# Patient Record
Sex: FEMALE | Race: WHITE | NOT HISPANIC OR LATINO | Employment: OTHER | URBAN - NONMETROPOLITAN AREA
[De-identification: names, ages, dates, MRNs, and addresses within clinical notes are randomized per-mention and may not be internally consistent; named-entity substitution may affect disease eponyms.]

---

## 2017-01-26 ENCOUNTER — OFFICE VISIT (OUTPATIENT)
Dept: CARDIOLOGY | Facility: CLINIC | Age: 72
End: 2017-01-26
Payer: MEDICARE

## 2017-01-26 VITALS
WEIGHT: 155 LBS | HEART RATE: 53 BPM | OXYGEN SATURATION: 93 % | BODY MASS INDEX: 26.46 KG/M2 | DIASTOLIC BLOOD PRESSURE: 62 MMHG | HEIGHT: 64 IN | SYSTOLIC BLOOD PRESSURE: 128 MMHG

## 2017-01-26 DIAGNOSIS — Z95.5 STENTED CORONARY ARTERY: ICD-10-CM

## 2017-01-26 DIAGNOSIS — J43.9 PULMONARY EMPHYSEMA, UNSPECIFIED EMPHYSEMA TYPE (HCC): ICD-10-CM

## 2017-01-26 DIAGNOSIS — G47.34 NOCTURNAL HYPOXEMIA: ICD-10-CM

## 2017-01-26 DIAGNOSIS — E78.5 HYPERLIPIDEMIA, UNSPECIFIED HYPERLIPIDEMIA TYPE: ICD-10-CM

## 2017-01-26 DIAGNOSIS — I25.10 ASCVD (ARTERIOSCLEROTIC CARDIOVASCULAR DISEASE): ICD-10-CM

## 2017-01-26 DIAGNOSIS — I25.2 OLD MI (MYOCARDIAL INFARCTION): ICD-10-CM

## 2017-01-26 DIAGNOSIS — I10 ESSENTIAL HYPERTENSION: ICD-10-CM

## 2017-01-26 DIAGNOSIS — R06.09 DOE (DYSPNEA ON EXERTION): ICD-10-CM

## 2017-01-26 PROCEDURE — G8598 ASA/ANTIPLAT THER USED: HCPCS | Performed by: INTERNAL MEDICINE

## 2017-01-26 PROCEDURE — 1101F PT FALLS ASSESS-DOCD LE1/YR: CPT | Performed by: INTERNAL MEDICINE

## 2017-01-26 PROCEDURE — 1036F TOBACCO NON-USER: CPT | Performed by: INTERNAL MEDICINE

## 2017-01-26 PROCEDURE — 4040F PNEUMOC VAC/ADMIN/RCVD: CPT | Mod: 8P | Performed by: INTERNAL MEDICINE

## 2017-01-26 PROCEDURE — G8420 CALC BMI NORM PARAMETERS: HCPCS | Performed by: INTERNAL MEDICINE

## 2017-01-26 PROCEDURE — 3017F COLORECTAL CA SCREEN DOC REV: CPT | Mod: 8P | Performed by: INTERNAL MEDICINE

## 2017-01-26 PROCEDURE — 99214 OFFICE O/P EST MOD 30 MIN: CPT | Performed by: INTERNAL MEDICINE

## 2017-01-26 PROCEDURE — 93000 ELECTROCARDIOGRAM COMPLETE: CPT | Performed by: INTERNAL MEDICINE

## 2017-01-26 PROCEDURE — G8482 FLU IMMUNIZE ORDER/ADMIN: HCPCS | Performed by: INTERNAL MEDICINE

## 2017-01-26 PROCEDURE — G8432 DEP SCR NOT DOC, RNG: HCPCS | Performed by: INTERNAL MEDICINE

## 2017-01-26 PROCEDURE — 3014F SCREEN MAMMO DOC REV: CPT | Mod: 8P | Performed by: INTERNAL MEDICINE

## 2017-01-26 RX ORDER — EZETIMIBE 10 MG/1
10 TABLET ORAL DAILY
Qty: 30 TAB | Refills: 11 | Status: SHIPPED | OUTPATIENT
Start: 2017-01-26 | End: 2017-03-13 | Stop reason: CLARIF

## 2017-01-26 NOTE — PROGRESS NOTES
Chief Complaint   Patient presents with   • Follow-Up       This patient is an established female who is here today to discuss:  Recheck CAD, old MI,     Patient Active Problem List    Diagnosis Date Noted   • Osteoporosis 04/07/2016   • Hyperlipidemia 04/07/2016   • Esophageal reflux 04/07/2016   • Essential hypertension 04/07/2016   • SOB (shortness of breath) 04/07/2016   • ASCVD (arteriosclerotic cardiovascular disease) 04/07/2016       Past Medical History   Diagnosis Date   • Hyperlipidemia    • Hypertension    • Osteoporosis      Past Surgical History   Procedure Laterality Date   • Other       Heart Attack 2013       Current Outpatient Prescriptions   Medication Sig Dispense Refill   • ezetimibe (ZETIA) 10 MG Tab Take 1 Tab by mouth every day. 30 Tab 11   • celecoxib (CELEBREX) 100 MG Cap Take 1 Cap by mouth 2 times a day. 60 Cap 3   • losartan (COZAAR) 50 MG Tab Take 1 Tab by mouth every day. 90 Tab 3   • atorvastatin (LIPITOR) 40 MG Tab Take 1 Tab by mouth every evening. 90 Tab 3   • triamcinolone acetonide (KENALOG) 0.1 % Cream   3   • pantoprazole (PROTONIX) 40 MG Tablet Delayed Response Take 40 mg by mouth every day.     • metoprolol (LOPRESSOR) 25 MG Tab Take 25 mg by mouth 2 times a day.     • aspirin EC (ECOTRIN) 81 MG Tablet Delayed Response Take 81 mg by mouth every day.     • alendronate (FOSAMAX) 35 MG tablet Take 35 mg by mouth every 7 days.       No current facility-administered medications for this visit.     Review of patient's allergies indicates no known allergies.      Review of Systems:     Constitutional: Denies fevers, Denies weight changes  Eyes: Denies changes in vision, no eye pain  Ears/Nose/Throat/Mouth: Denies nasal congestion or sore throat   Cardiovascular: Denies chest pain or palpitations   Respiratory: PYLE;  shortness of breath , Denies cough  Gastrointestinal/Hepatic: Denies abdominal pain, nausea, vomiting, diarrhea, constipation or GI bleeding   Genitourinary: Denies  "bladder dysfunction, dysuria or frequency  Musculoskeletal/Rheum: Denies  joint pain and swelling   Skin/Breast: Denies rash, denies breast lumps or discharge  Neurological: Denies headache, confusion, memory loss or focal weakness/parasthesias  Psychiatric: denies mood disorder   Endocrine: denies hx of diabetes or thyroid dysfunction  Heme/Oncology/Lymph Nodes: Denies enlarged lymph nodes, denies brusing or known bleeding disorder  Allergic/Immunologic: Denies hx of allergies      All other systems were reviewed and are negative (AMA/CMS criteria)      Blood pressure 128/62, pulse 53, height 1.626 m (5' 4.02\"), weight 70.308 kg (155 lb), SpO2 93 %.  General Appearance:   Well developed, Well nourished, No acute distress, Non-toxic appearance.    HENT:  Normocephalic, Atraumatic, Oropharynx moist mucous membranes, Dentition: Mallampati 4 OP, Nose normal.    Eyes:  PERRLA, EOMI, Conjunctiva normal, No discharge.  Neck:  Normal range of motion, No cervical tenderness, Supple, No stridor, no  JVD .  No thyromegaly.  No carotid bruit.  Cardiovascular:  Normal heart rate, Normal rhythm, loud S1, S2, no S3,  S4; No gallops; No murmurs, No rubs, .   Extremitites with intact distal pulses, no cyanosis, clubbing or edema.  No heaves, thrills, HJR;  Peripheral pulses: carotid 2+, brachial 2+, radial 2+, ulnar 2+, femoral 2+, popliteal 2+, PT 2+, DP 2+;  Lungs:  Respiratory effort is normal. Normal breath sounds, breath sounds clear to auscultation bilaterally,  no rales, no rhonchi, no wheezing.   Abdomen: Bowel sounds normal, Soft, No tenderness, No guarding, No rebound, No masses, No hepatosplenomegaly.  Skin: Warm, Dry, No erythema, No rash, no induration or crepitus.  Neurologic: Alert & oriented x 3, Normal motor function, Normal sensory function, No focal deficits noted, cranial nerves II through XII are normal,  normal gait.  Psychiatric: Affect normal, Judgment normal, Mood normal    Results for ELANA NOVOA (MRN " [id number]) as of 1/26/2017 09:21   Ref. Range 4/27/2016 10:20   Cholesterol,Tot Latest Ref Range: 120-200 mg/dL 148   Triglycerides Latest Ref Range: 0-150 mg/dL 50   HDL Latest Ref Range: 40.0-60.0 mg/dL 47.0   Non HDL Cholesterol Latest Ref Range:   101   LDL Latest Ref Range: <100 mg/dL 91   Chol-Hdl Ratio Unknown 3.15     Assessment and Plan.   72 y.o. female has high CV risk 10 yr for 16.5%; add zetia and recheck FLP; Moderate increase PYLE; will order stress test;     1. ASCVD (arteriosclerotic cardiovascular disease)    - ezetimibe (ZETIA) 10 MG Tab; Take 1 Tab by mouth every day.  Dispense: 30 Tab; Refill: 11  - LIPID PANEL    2. Old MI (myocardial infarction)    - ezetimibe (ZETIA) 10 MG Tab; Take 1 Tab by mouth every day.  Dispense: 30 Tab; Refill: 11  - LIPID PANEL    3. Hyperlipidemia, unspecified hyperlipidemia type    - ezetimibe (ZETIA) 10 MG Tab; Take 1 Tab by mouth every day.  Dispense: 30 Tab; Refill: 11  - LIPID PANEL    4. Stented coronary artery  Discussed risks and educated about the subject related matters      5. Essential hypertension  controlled    6. Nocturnal hypoxemia  On O2    7. Pulmonary emphysema, unspecified emphysema type (CMS-HCC)  good    8. PYLE (dyspnea on exertion)  Worse, stress test      1. ASCVD (arteriosclerotic cardiovascular disease)  ezetimibe (ZETIA) 10 MG Tab    LIPID PANEL    NM-CARDIAC STRESS TEST    ECHOCARDIOGRAM COMP W/O CONT   2. Old MI (myocardial infarction)  ezetimibe (ZETIA) 10 MG Tab    LIPID PANEL   3. Hyperlipidemia, unspecified hyperlipidemia type  ezetimibe (ZETIA) 10 MG Tab    LIPID PANEL   4. Stented coronary artery     5. Essential hypertension     6. Nocturnal hypoxemia     7. Pulmonary emphysema, unspecified emphysema type (CMS-HCC)  REF[facility]   8. PYLE (dyspnea on exertion)  NM-CARDIAC STRESS TEST    REF[facility]AL TO [facility]    ECHOCARDIOGRAM COMP W/O CONT

## 2017-01-26 NOTE — MR AVS SNAPSHOT
"        Carmen Birmingham   2017 8:40 AM   Office Visit   MRN: [id number]    Department:  Heart [facility]   Dept Phone:  [phone]    Description:  Female : 1945   Provider:  Matt Cleveland MD,[facility]           Reason for Visit     Follow-Up           Allergies as of 2017     No Known Allergies      You were diagnosed with     ASCVD (arteriosclerotic cardiovascular disease)   [056147]       Old MI (myocardial infarction)   [853558]       Hyperlipidemia, unspecified hyperlipidemia type   [9033400]       Stented coronary artery   [956798]       Essential hypertension   [0834054]       Nocturnal hypoxemia   [479540]       Pulmonary emphysema, unspecified emphysema type (CMS-HCC)   [3409364]       PYLE (dyspnea on exertion)   [065894]         Vital Signs     Blood Pressure Pulse Height Weight Body Mass Index Oxygen Saturation    128/62 mmHg 53 1.626 m (5' 4.02\") 70.308 kg (155 lb) 26.59 kg/m2 93%    Smoking Status                   Former Smoker           Basic Information     Date Of Birth Sex Race Ethnicity Preferred Language    1945 Female White Non- English      Your appointments     2017  1:00 PM   COMPLETE PFT with RESPIRATORY OUTPATIENT SCHEDULE   PFT LAB [facility] (--)    [address]  [facility] [address]   [phone]            2017  9:00 AM   CV NM STRESS W/RESP with [facility] NM, RESPIRATORY OUTPATIENT SCHEDULE   [facility] MEDICAL IMAGING - [facility]ly [facility] ([facility] Patient Care)    [facility]  [address] [facility] [address] [phone]            2017  2:00 PM   Echo with ECHO [facility]   ECHO [facility] (--)    [address]  [facility] [address]   [phone]            May 02, 2017  8:00 AM   FOLLOW UP with Matt Cleveland MD,[facility] ([facility]    [address]  [address]nd [address]  [facility] [address]   [phone]            May 04, 2017  9:00 AM   Annual Exam with " Guy Dick M.D.   [facility] (--)    [address]  [facility] [id number] [phone]              Problem List              ICD-10-CM Priority Class Noted - Resolved    Osteoporosis M81.0   4/7/2016 - Present    Hyperlipidemia E78.5   4/7/2016 - Present    Esophageal reflux K21.9   4/7/2016 - Present    Essential hypertension I10   4/7/2016 - Present    SOB (shortness of breath) R06.02   4/7/2016 - Present    ASCVD (arteriosclerotic cardiovascular disease) I25.10   4/7/2016 - Present      Health Maintenance        Date Due Completion Dates    IMM DTaP/Tdap/Td Vaccine (1 - Tdap) 1/17/1963 ---    PAP SMEAR 1/17/1965 ---    MAMMOGRAM 1/17/1984 ---    COLONOSCOPY 1/17/1994 ---    IMM ZOSTER VACCINE 1/17/2004 ---    BONE DENSITY 1/17/2009 ---    IMM PNEUMOCOCCAL 65+ (ADULT) LOW/MEDIUM RISK SERIES (1 of 2 - PCV13) 1/17/2009 ---            Results       Current Immunizations     Influenza Vaccine Quad Inj (Pf) 10/20/2016 11:00 AM      Below and/or attached are the medications your provider expects you to take. Review all of your home medications and newly ordered medications with your provider and/or pharmacist. Follow medication instructions as directed by your provider and/or pharmacist. Please keep your medication list with you and share with your provider. Update the information when medications are discontinued, doses are changed, or new medications (including over-the-counter products) are added; and carry medication information at all times in the event of emergency situations     Allergies:  No Known Allergies          Medications  Valid as of: January 26, 2017 - 10:13 AM    Generic Name Brand Name Tablet Size Instructions for use    Alendronate Sodium (Tab) FOSAMAX 35 MG Take 35 mg by mouth every 7 days.        Aspirin (Tablet Delayed Response) ECOTRIN 81 MG Take 81 mg by mouth every day.        Atorvastatin Calcium (Tab) LIPITOR 40 MG Take 1 Tab by mouth every evening.        Celecoxib  (Cap) CELEBREX 100 MG Take 1 Cap by mouth 2 times a day.        Ezetimibe (Tab) ZETIA 10 MG Take 1 Tab by mouth every day.        Losartan Potassium (Tab) COZAAR 50 MG Take 1 Tab by mouth every day.        Metoprolol Tartrate (Tab) LOPRESSOR 25 MG Take 25 mg by mouth 2 times a day.        Pantoprazole Sodium (Tablet Delayed Response) PROTONIX 40 MG Take 40 mg by mouth every day.        Triamcinolone Acetonide (Cream) KENALOG 0.1 %         .                 Medicines prescribed today were sent to:     [facility] [address] [place], [place] - [address]    [address] [address]    Phone: [phone] Fax: [phone]    Open 24 Hours?: No    [facility] - [place], [place] - [address]    [address] [address]    Phone: [phone] Fax: [phone]    Open 24 Hours?: No      Medication refill instructions:       If your prescription bottle indicates you have medication refills left, it is not necessary to call your provider’s office. Please contact your pharmacy and they will refill your medication.    If your prescription bottle indicates you do not have any refills left, you may request refills at any time through one of the following ways: The online [employer] system (except Urgent Care), by calling your provider’s office, or by asking your pharmacy to contact your provider’s office with a refill request. Medication refills are processed only during regular business hours and may not be available until the next business day. Your provider may request additional information or to have a follow-up visit with you prior to refilling your medication.   *Please Note: Medication refills are assigned a new Rx number when refilled electronically. Your pharmacy may indicate that no refills were authorized even though a new prescription for the same medication is available at the pharmacy. Please request the medicine by name with the pharmacy before contacting  your provider for a refill.        Your To Do List     Future Labs/Procedures Complete By Expires    ECHOCARDIOGRAM COMP W/O CONT  As directed 1/26/2018    NM-CARDIAC STRESS TEST  As directed 7/26/2017      Referral     A referral request has been sent to our patient care coordination department. Please allow 3-5 business days for us to process this request and contact you either by phone or mail. If you do not hear from us by the 5th business day, please call us at [phone].           [employer] Access Code: [email or web address]  Expires: 2/2/2017 11:45 AM    [employer]  A secure, online tool to manage your health information     [employer]’s [employer]® is a secure, online tool that connects you to your personalized health information from the privacy of your home -- day or night - making it very easy for you to manage your healthcare. Once the activation process is completed, you can even access your medical information using the [employer] addie, which is available for free in the Apple Addie store or Google Play store.     [employer] provides the following levels of access (as shown below):   My Chart Features   [facility] Primary [facility] Doctor [facility]  Specialists [facility]  Urgent  Care Non-[facility]  Primary Care  Doctor   Email your healthcare team securely and privately 24/7 X X X    Manage appointments: schedule your next appointment; view details of past/upcoming appointments X      Request prescription refills. X      View recent personal medical records, including lab and immunizations X X X X   View health record, including health history, allergies, medications X X X X   Read reports about your outpatient visits, procedures, consult and ER notes X X X X   See your discharge summary, which is a recap of your hospital and/or ER visit that includes your diagnosis, lab results, and care plan. X X       How to register for [employer]:  1. Go to  [email or web address].  2. Click on the Sign Up Now box, which takes you to the New  Member Sign Up page. You will need to provide the following information:  a. Enter your [employer] Access Code exactly as it appears at the top of this page. (You will not need to use this code after you’ve completed the sign-up process. If you do not sign up before the expiration date, you must request a new code.)   b. Enter your date of birth.   c. Enter your home email address.   d. Click Submit, and follow the next screen’s instructions.  3. Create a [employer] ID. This will be your [employer] login ID and cannot be changed, so think of one that is secure and easy to remember.  4. Create a [employer] password. You can change your password at any time.  5. Enter your Password Reset Question and Answer. This can be used at a later time if you forget your password.   6. Enter your e-mail address. This allows you to receive e-mail notifications when new information is available in [employer].  7. Click Sign Up. You can now view your health information.    For assistance activating your [employer] account, call [phone]

## 2017-01-26 NOTE — Clinical Note
[facility] [address],   [address]nd [address]  [place] [address]  Phone: [phone]  Fax: [phone]              Carmen Birmingham  1945    Encounter Date: 1/26/2017    Guy Dick M.D.    Thank you for the referral. I had the pleasure of seeing Carmen Birmingham today in cardiology clinic. I've attached my visit note below. If you have any questions please feel free to give me a call anytime.      Matt Cleveland MD, PhD, [facility]  Cardiology and Lipidology  [facility]                                                                    PROGRESS NOTE:  Chief Complaint   Patient presents with   • Follow-Up       This patient is an established female who is here today to discuss:  Recheck CAD, old MI,     Patient Active Problem List    Diagnosis Date Noted   • Osteoporosis 04/07/2016   • Hyperlipidemia 04/07/2016   • Esophageal reflux 04/07/2016   • Essential hypertension 04/07/2016   • SOB (shortness of breath) 04/07/2016   • ASCVD (arteriosclerotic cardiovascular disease) 04/07/2016       Past Medical History   Diagnosis Date   • Hyperlipidemia    • Hypertension    • Osteoporosis      Past Surgical History   Procedure Laterality Date   • Other       Heart Attack 2013       Current Outpatient Prescriptions   Medication Sig Dispense Refill   • ezetimibe (ZETIA) 10 MG Tab Take 1 Tab by mouth every day. 30 Tab 11   • celecoxib (CELEBREX) 100 MG Cap Take 1 Cap by mouth 2 times a day. 60 Cap 3   • losartan (COZAAR) 50 MG Tab Take 1 Tab by mouth every day. 90 Tab 3   • atorvastatin (LIPITOR) 40 MG Tab Take 1 Tab by mouth every evening. 90 Tab 3   • triamcinolone acetonide (KENALOG) 0.1 % Cream   3   • pantoprazole (PROTONIX) 40 MG Tablet Delayed Response Take 40 mg by mouth every day.     • metoprolol (LOPRESSOR) 25 MG Tab Take 25 mg by mouth 2 times a day.     • aspirin EC (ECOTRIN) 81 MG Tablet Delayed Response Take 81  "mg by mouth every day.     • alendronate (FOSAMAX) 35 MG tablet Take 35 mg by mouth every 7 days.       No current facility-administered medications for this visit.     Review of patient's allergies indicates no known allergies.      Review of Systems:     Constitutional: Denies fevers, Denies weight changes  Eyes: Denies changes in vision, no eye pain  Ears/Nose/Throat/Mouth: Denies nasal congestion or sore throat   Cardiovascular: Denies chest pain or palpitations   Respiratory: PYLE;  shortness of breath , Denies cough  Gastrointestinal/Hepatic: Denies abdominal pain, nausea, vomiting, diarrhea, constipation or GI bleeding   Genitourinary: Denies bladder dysfunction, dysuria or frequency  Musculoskeletal/Rheum: Denies  joint pain and swelling   Skin/Breast: Denies rash, denies breast lumps or discharge  Neurological: Denies headache, confusion, memory loss or focal weakness/parasthesias  Psychiatric: denies mood disorder   Endocrine: denies hx of diabetes or thyroid dysfunction  Heme/Oncology/Lymph Nodes: Denies enlarged lymph nodes, denies brusing or known bleeding disorder  Allergic/Immunologic: Denies hx of allergies      All other systems were reviewed and are negative (AMA/CMS criteria)      Blood pressure 128/62, pulse 53, height 1.626 m (5' 4.02\"), weight 70.308 kg (155 lb), SpO2 93 %.  General Appearance:   Well developed, Well nourished, No acute distress, Non-toxic appearance.    HENT:  Normocephalic, Atraumatic, Oropharynx moist mucous membranes, Dentition: Mallampati 4 OP, Nose normal.    Eyes:  PERRLA, EOMI, Conjunctiva normal, No discharge.  Neck:  Normal range of motion, No cervical tenderness, Supple, No stridor, no  JVD .  No thyromegaly.  No carotid bruit.  Cardiovascular:  Normal heart rate, Normal rhythm, loud S1, S2, no S3,  S4; No gallops; No murmurs, No rubs, .   Extremitites with intact distal pulses, no cyanosis, clubbing or edema.  No heaves, thrills, HJR;  Peripheral pulses: carotid 2+, " brachial 2+, radial 2+, ulnar 2+, femoral 2+, popliteal 2+, PT 2+, DP 2+;  Lungs:  Respiratory effort is normal. Normal breath sounds, breath sounds clear to auscultation bilaterally,  no rales, no rhonchi, no wheezing.   Abdomen: Bowel sounds normal, Soft, No tenderness, No guarding, No rebound, No masses, No hepatosplenomegaly.  Skin: Warm, Dry, No erythema, No rash, no induration or crepitus.  Neurologic: Alert & oriented x 3, Normal motor function, Normal sensory function, No focal deficits noted, cranial nerves II through XII are normal,  normal gait.  Psychiatric: Affect normal, Judgment normal, Mood normal    Results for ELANA NOVOA (MRN [id number]) as of 1/26/2017 09:21   Ref. Range 4/27/2016 10:20   Cholesterol,Tot Latest Ref Range: 120-200 mg/dL 148   Triglycerides Latest Ref Range: 0-150 mg/dL 50   HDL Latest Ref Range: 40.0-60.0 mg/dL 47.0   Non HDL Cholesterol Latest Ref Range:   101   LDL Latest Ref Range: <100 mg/dL 91   Chol-Hdl Ratio Unknown 3.15     Assessment and Plan.   72 y.o. female has high CV risk 10 yr for 16.5%; add zetia and recheck FLP; Moderate increase PYLE; will order stress test;     1. ASCVD (arteriosclerotic cardiovascular disease)    - ezetimibe (ZETIA) 10 MG Tab; Take 1 Tab by mouth every day.  Dispense: 30 Tab; Refill: 11  - LIPID PANEL    2. Old MI (myocardial infarction)    - ezetimibe (ZETIA) 10 MG Tab; Take 1 Tab by mouth every day.  Dispense: 30 Tab; Refill: 11  - LIPID PANEL    3. Hyperlipidemia, unspecified hyperlipidemia type    - ezetimibe (ZETIA) 10 MG Tab; Take 1 Tab by mouth every day.  Dispense: 30 Tab; Refill: 11  - LIPID PANEL    4. Stented coronary artery  Discussed risks and educated about the subject related matters      5. Essential hypertension  controlled    6. Nocturnal hypoxemia  On O2    7. Pulmonary emphysema, unspecified emphysema type (CMS-HCC)  good    8. PYLE (dyspnea on exertion)  Worse, stress test      1. ASCVD (arteriosclerotic cardiovascular  disease)  ezetimibe (ZETIA) 10 MG Tab    LIPID PANEL    NM-CARDIAC STRESS TEST    ECHOCARDIOGRAM COMP W/O CONT   2. Old MI (myocardial infarction)  ezetimibe (ZETIA) 10 MG Tab    LIPID PANEL   3. Hyperlipidemia, unspecified hyperlipidemia type  ezetimibe (ZETIA) 10 MG Tab    LIPID PANEL   4. Stented coronary artery     5. Essential hypertension     6. Nocturnal hypoxemia     7. Pulmonary emphysema, unspecified emphysema type (CMS-[facility])  REFERRAL TO [facility]   8. PYLE (dyspnea on exertion)  NM-CARDIAC STRESS TEST    REFERRAL TO [facility]    ECHOCARDIOGRAM COMP W/O CONT             Guy Dick M.D.  [address] [place] [address] [address]  [facility] [address]  VIA In Basket

## 2017-01-27 LAB — EKG IMPRESSION: NORMAL

## 2017-02-01 ENCOUNTER — TELEPHONE (OUTPATIENT)
Dept: CARDIOLOGY | Facility: MEDICAL CENTER | Age: 72
End: 2017-02-01

## 2017-02-02 DIAGNOSIS — I25.2 OLD MI (MYOCARDIAL INFARCTION): ICD-10-CM

## 2017-02-02 DIAGNOSIS — I25.10 ASCVD (ARTERIOSCLEROTIC CARDIOVASCULAR DISEASE): ICD-10-CM

## 2017-02-02 PROCEDURE — 93306 TTE W/DOPPLER COMPLETE: CPT | Performed by: INTERNAL MEDICINE

## 2017-02-22 ENCOUNTER — TELEPHONE (OUTPATIENT)
Dept: CARDIOLOGY | Facility: MEDICAL CENTER | Age: 72
End: 2017-02-22

## 2017-02-22 NOTE — TELEPHONE ENCOUNTER
----- Message from Sebastien Alejo L.P.N. sent at 2/21/2017  6:41 PM PST -----  Regarding: FW: TEST RESULTS      ----- Message -----     From: Maile Gordon, Med Ass't     Sent: 2/21/2017   1:45 PM       To: Sebastien Alejo L.P.N.  Subject: TEST RESULTS                                     Pt called and would like a call relative to her Stress test and PFT both done early Feb.    Thanks

## 2017-02-22 NOTE — TELEPHONE ENCOUNTER
"Dr. Cleveland, please review her echo & PFT in media. She was called with her good MPI results on 2/1, but calls now because never was notified about the other 2 tests. Her FV isn't until May in [facility]. I reviewed both with her, but she'd like your comments, since it's so long before her FV. Look for the PFT scanned as \"demographics CPFT 2/2/17\".   "

## 2017-02-23 NOTE — TELEPHONE ENCOUNTER
Message to MINNIE Garcia to look at schedule to see when pt. could be worked in in [facility] for sooner F[facility].      Message  Received: Today       Matt Cleveland MD,[facility]  Carmen Peoples R.N.       Caller: Unspecified (Today, 9:02 AM)                     Echo was not too bad but PFT was abnormal; May need discussion, comes in sooner and/or consider pulm consult; Thx

## 2017-03-02 ENCOUNTER — OFFICE VISIT (OUTPATIENT)
Dept: CARDIOLOGY | Facility: CLINIC | Age: 72
End: 2017-03-02
Payer: MEDICARE

## 2017-03-02 VITALS
OXYGEN SATURATION: 93 % | WEIGHT: 159 LBS | HEART RATE: 73 BPM | DIASTOLIC BLOOD PRESSURE: 64 MMHG | BODY MASS INDEX: 27.14 KG/M2 | HEIGHT: 64 IN | SYSTOLIC BLOOD PRESSURE: 112 MMHG

## 2017-03-02 DIAGNOSIS — I25.2 OLD MI (MYOCARDIAL INFARCTION): ICD-10-CM

## 2017-03-02 DIAGNOSIS — Z95.5 STENTED CORONARY ARTERY: ICD-10-CM

## 2017-03-02 DIAGNOSIS — E78.5 HYPERLIPIDEMIA, UNSPECIFIED HYPERLIPIDEMIA TYPE: ICD-10-CM

## 2017-03-02 DIAGNOSIS — I25.10 ASCVD (ARTERIOSCLEROTIC CARDIOVASCULAR DISEASE): ICD-10-CM

## 2017-03-02 DIAGNOSIS — J44.9 CHRONIC OBSTRUCTIVE PULMONARY DISEASE, UNSPECIFIED COPD TYPE (HCC): ICD-10-CM

## 2017-03-02 PROCEDURE — G8598 ASA/ANTIPLAT THER USED: HCPCS | Performed by: INTERNAL MEDICINE

## 2017-03-02 PROCEDURE — G8482 FLU IMMUNIZE ORDER/ADMIN: HCPCS | Performed by: INTERNAL MEDICINE

## 2017-03-02 PROCEDURE — 4040F PNEUMOC VAC/ADMIN/RCVD: CPT | Mod: 8P | Performed by: INTERNAL MEDICINE

## 2017-03-02 PROCEDURE — 99214 OFFICE O/P EST MOD 30 MIN: CPT | Performed by: INTERNAL MEDICINE

## 2017-03-02 PROCEDURE — 1036F TOBACCO NON-USER: CPT | Performed by: INTERNAL MEDICINE

## 2017-03-02 PROCEDURE — G8420 CALC BMI NORM PARAMETERS: HCPCS | Performed by: INTERNAL MEDICINE

## 2017-03-02 PROCEDURE — 1101F PT FALLS ASSESS-DOCD LE1/YR: CPT | Performed by: INTERNAL MEDICINE

## 2017-03-02 PROCEDURE — 3017F COLORECTAL CA SCREEN DOC REV: CPT | Mod: 8P | Performed by: INTERNAL MEDICINE

## 2017-03-02 PROCEDURE — G8432 DEP SCR NOT DOC, RNG: HCPCS | Performed by: INTERNAL MEDICINE

## 2017-03-02 PROCEDURE — 3014F SCREEN MAMMO DOC REV: CPT | Mod: 8P | Performed by: INTERNAL MEDICINE

## 2017-03-02 NOTE — PROGRESS NOTES
Chief Complaint   Patient presents with   • Follow-Up       This patient is an established female who is here today to discuss:  Recheck PFT, SOB, CAD and cardiac w/u's;     Patient Active Problem List    Diagnosis Date Noted   • Osteoporosis 04/07/2016   • Hyperlipidemia 04/07/2016   • Esophageal reflux 04/07/2016   • Essential hypertension 04/07/2016   • SOB (shortness of breath) 04/07/2016   • ASCVD (arteriosclerotic cardiovascular disease) 04/07/2016       Past Medical History   Diagnosis Date   • Hyperlipidemia    • Hypertension    • Osteoporosis      Past Surgical History   Procedure Laterality Date   • Other       Heart Attack 2013       Current Outpatient Prescriptions   Medication Sig Dispense Refill   • ezetimibe (ZETIA) 10 MG Tab Take 1 Tab by mouth every day. 30 Tab 11   • celecoxib (CELEBREX) 100 MG Cap Take 1 Cap by mouth 2 times a day. 60 Cap 3   • losartan (COZAAR) 50 MG Tab Take 1 Tab by mouth every day. 90 Tab 3   • atorvastatin (LIPITOR) 40 MG Tab Take 1 Tab by mouth every evening. 90 Tab 3   • triamcinolone acetonide (KENALOG) 0.1 % Cream   3   • pantoprazole (PROTONIX) 40 MG Tablet Delayed Response Take 40 mg by mouth every day.     • metoprolol (LOPRESSOR) 25 MG Tab Take 25 mg by mouth 2 times a day.     • aspirin EC (ECOTRIN) 81 MG Tablet Delayed Response Take 81 mg by mouth every day.     • alendronate (FOSAMAX) 35 MG tablet Take 35 mg by mouth every 7 days.       No current facility-administered medications for this visit.     Review of patient's allergies indicates no known allergies.      Review of Systems:     Constitutional: Denies fevers, Denies weight changes  Eyes: Denies changes in vision, no eye pain  Ears/Nose/Throat/Mouth: Denies nasal congestion or sore throat   Cardiovascular: Denies chest pain or palpitations   Respiratory: PYLE; shortness of breath , Denies cough  Gastrointestinal/Hepatic: Denies abdominal pain, nausea, vomiting, diarrhea, constipation or GI bleeding  "  Genitourinary: Denies bladder dysfunction, dysuria or frequency  Musculoskeletal/Rheum: Knee,   joint pain and swelling   Skin/Breast: Denies rash, denies breast lumps or discharge  Neurological: Denies headache, confusion, memory loss or focal weakness/parasthesias  Psychiatric: denies mood disorder   Endocrine: denies hx of diabetes or thyroid dysfunction  Heme/Oncology/Lymph Nodes: Denies enlarged lymph nodes, denies brusing or known bleeding disorder  Allergic/Immunologic: Denies hx of allergies      All other systems were reviewed and are negative (AMA/CMS criteria)      Blood pressure 112/64, pulse 73, height 1.626 m (5' 4\"), weight 72.122 kg (159 lb), SpO2 93 %.  General Appearance:   Well developed, Well nourished, No acute distress, Non-toxic appearance.    HENT:  Normocephalic, Atraumatic, Oropharynx moist mucous membranes, Dentition: Mallampati 4 OP, Nose normal.    Eyes:  PERRLA, EOMI, Conjunctiva normal, No discharge.  Neck:  Normal range of motion, No cervical tenderness, Supple, No stridor, no  JVD .  No thyromegaly.  No carotid bruit.  Cardiovascular:  Normal heart rate, Normal rhythm, loud S1, S2, no S3,  S4; No gallops; No murmurs, No rubs, .   Extremitites with intact distal pulses, no cyanosis, clubbing or edema.  No heaves, thrills, HJR;  Peripheral pulses: carotid 2+, brachial 2+, radial 2+, ulnar 2+, femoral 2+, popliteal 2+, PT 2+, DP 2+;  Lungs:  Respiratory effort is normal. Normal breath sounds, breath sounds clear to auscultation bilaterally,  no rales, no rhonchi, no wheezing.   Abdomen: Bowel sounds normal, Soft, No tenderness, No guarding, No rebound, No masses, No hepatosplenomegaly.  Skin: Warm, Dry, No erythema, No rash, no induration or crepitus.  Neurologic: Alert & oriented x 3, Normal motor function, Normal sensory function, No focal deficits noted, cranial nerves II through XII are normal,  normal gait.  Psychiatric: Affect normal, Judgment normal, Mood normal    Stress: 1. "  No evidence for Lexiscan induced ischemia or infarction.  2.  There is mild inferior wall hypokinesis.  The remaining myocardial walls contract normally.  3.  Ejection fraction 60%.      Assessment and Plan.   72 y.o. female has main c/o sob; Reviewed PFT and may need trial of inhaler; Mod COPD and may have beginning of emphysema; Cardiac eval was neg for ischemia;  Reassured; Zetia generic used and recheck FLP in 2 more months;     1. ASCVD (arteriosclerotic cardiovascular disease)  asx    2. Old MI (myocardial infarction)  asx    3. Hyperlipidemia, unspecified hyperlipidemia type  recheck    4. Stented coronary artery  asx    5. Chronic obstructive pulmonary disease, unspecified COPD type (CMS-HCC)  Reviewed, see above      1. ASCVD (arteriosclerotic cardiovascular disease)     2. Old MI (myocardial infarction)     3. Hyperlipidemia, unspecified hyperlipidemia type     4. Stented coronary artery     5. Chronic obstructive pulmonary disease, unspecified COPD type (CMS-HCC)      Moderate

## 2017-03-02 NOTE — MR AVS SNAPSHOT
"        Carmen Birmingham   3/2/2017 12:00 PM   Office Visit   MRN: [id number]    [facility]   Dept Phone:  [phone]    Description:  Female : 1945   Provider:  Matt Cleveland MD,[facility]           Reason for Visit     Follow-Up           Allergies as of 3/2/2017     No Known Allergies      You were diagnosed with     ASCVD (arteriosclerotic cardiovascular disease)   [489418]       Old MI (myocardial infarction)   [241028]       Hyperlipidemia, unspecified hyperlipidemia type   [8090481]       Stented coronary artery   [363141]       Chronic obstructive pulmonary disease, unspecified COPD type (CMS-[facility])   [8208067]   Moderate      Vital Signs     Blood Pressure Pulse Height Weight Body Mass Index Oxygen Saturation    112/64 mmHg 73 1.626 m (5' 4\") 72.122 kg (159 lb) 27.28 kg/m2 93%    Smoking Status                   Former Smoker           Basic Information     Date Of Birth Sex Race Ethnicity Preferred Language    1945 Female White Non- English      Your appointments     May 02, 2017  8:00 AM   FOLLOW UP with Matt Cleveland [facility] ([facility]    [facility] [address]  2nd [address]  [facility] [address] [phone]            May 04, 2017  9:00 AM   Annual Exam with Guy Dick M.D.   [facility] (--)    [address] [place] [address] A  [facility] [id number] [phone]              Problem List              ICD-10-CM Priority Class Noted - Resolved    Osteoporosis M81.0   2016 - Present    Hyperlipidemia E78.5   2016 - Present    Esophageal reflux K21.9   2016 - Present    Essential hypertension I10   2016 - Present    SOB (shortness of breath) R06.02   2016 - Present    ASCVD (arteriosclerotic cardiovascular disease) I25.10   2016 - Present      Health Maintenance        Date Due Completion Dates    IMM DTaP/Tdap/Td Vaccine (1 - Tdap) 1963 ---    PAP SMEAR 1965 ---    MAMMOGRAM " 1/17/1984 ---    COLONOSCOPY 1/17/1994 ---    IMM ZOSTER VACCINE 1/17/2004 ---    BONE DENSITY 1/17/2009 ---    IMM PNEUMOCOCCAL 65+ (ADULT) LOW/MEDIUM RISK SERIES (1 of 2 - PCV13) 1/17/2009 ---            Current Immunizations     Influenza Vaccine Quad Inj (Pf) 10/20/2016 11:00 AM      Below and/or attached are the medications your provider expects you to take. Review all of your home medications and newly ordered medications with your provider and/or pharmacist. Follow medication instructions as directed by your provider and/or pharmacist. Please keep your medication list with you and share with your provider. Update the information when medications are discontinued, doses are changed, or new medications (including over-the-counter products) are added; and carry medication information at all times in the event of emergency situations     Allergies:  No Known Allergies          Medications  Valid as of: March 02, 2017 - 12:46 PM    Generic Name Brand Name Tablet Size Instructions for use    Alendronate Sodium (Tab) FOSAMAX 35 MG Take 35 mg by mouth every 7 days.        Aspirin (Tablet Delayed Response) ECOTRIN 81 MG Take 81 mg by mouth every day.        Atorvastatin Calcium (Tab) LIPITOR 40 MG Take 1 Tab by mouth every evening.        Celecoxib (Cap) CELEBREX 100 MG Take 1 Cap by mouth 2 times a day.        Ezetimibe (Tab) ZETIA 10 MG Take 1 Tab by mouth every day.        Losartan Potassium (Tab) COZAAR 50 MG Take 1 Tab by mouth every day.        Metoprolol Tartrate (Tab) LOPRESSOR 25 MG Take 25 mg by mouth 2 times a day.        Pantoprazole Sodium (Tablet Delayed Response) PROTONIX 40 MG Take 40 mg by mouth every day.        Triamcinolone Acetonide (Cream) KENALOG 0.1 %         .                 Medicines prescribed today were sent to:     [facility] [address] [place] [address], [place] [facility] [address] [facility]    [address] [facility] [address]    Phone: [phone] Fax: [phone]    Open 24 Hours?: No    [facility] - [place], [place] - [address]    [address] [facility] [address]    Phone: [phone] Fax: [phone]    Open 24 Hours?: No      Medication refill instructions:       If your prescription bottle indicates you have medication refills left, it is not necessary to call your provider’s office. Please contact your pharmacy and they will refill your medication.    If your prescription bottle indicates you do not have any refills left, you may request refills at any time through one of the following ways: The online Binpress system (except Urgent Care), by calling your provider’s office, or by asking your pharmacy to contact your provider’s office with a refill request. Medication refills are processed only during regular business hours and may not be available until the next business day. Your provider may request additional information or to have a follow-up visit with you prior to refilling your medication.   *Please Note: Medication refills are assigned a new Rx number when refilled electronically. Your pharmacy may indicate that no refills were authorized even though a new prescription for the same medication is available at the pharmacy. Please request the medicine by name with the pharmacy before contacting your provider for a refill.           Binpress Access Code: [email or web address]  Expires: 4/1/2017 12:24 PM    Binpress  A secure, online tool to manage your health information     [employer]’s Binpress® is a secure, online tool that connects you to your personalized health information from the privacy of your home -- day or night - making it very easy for you to manage your healthcare. Once the activation process is completed, you can even access your medical information using the Binpress addie, which is available for free in the Apple Addie store or Google Play store.     Binpress provides the following levels of access (as shown below):   My Chart Features   [facility]  Care Doctor [facility]  Specialists [facility]  Urgent  Care Non-[facility]  Primary Care  Doctor   Email your healthcare team securely and privately 24/7 X X X    Manage appointments: schedule your next appointment; view details of past/upcoming appointments X      Request prescription refills. X      View recent personal medical records, including lab and immunizations X X X X   View health record, including health history, allergies, medications X X X X   Read reports about your outpatient visits, procedures, consult and ER notes X X X X   See your discharge summary, which is a recap of your hospital and/or ER visit that includes your diagnosis, lab results, and care plan. X X       How to register for Aquantia:  1. Go to  [email or web address].  2. Click on the Sign Up Now box, which takes you to the New Member Sign Up page. You will need to provide the following information:  a. Enter your Aquantia Access Code exactly as it appears at the top of this page. (You will not need to use this code after you’ve completed the sign-up process. If you do not sign up before the expiration date, you must request a new code.)   b. Enter your date of birth.   c. Enter your home email address.   d. Click Submit, and follow the next screen’s instructions.  3. Create a Aquantia ID. This will be your Aquantia login ID and cannot be changed, so think of one that is secure and easy to remember.  4. Create a Aquantia password. You can change your password at any time.  5. Enter your Password Reset Question and Answer. This can be used at a later time if you forget your password.   6. Enter your e-mail address. This allows you to receive e-mail notifications when new information is available in Aquantia.  7. Click Sign Up. You can now view your health information.    For assistance activating your Aquantia account, call [phone]

## 2017-03-02 NOTE — Clinical Note
[facility] [address],   [address]nd [address]  [place] [address]  Phone: [phone]  Fax: [phone]              Carmen Birmingham  1945    Encounter Date: 3/2/2017    Guy Dick M.D.    Thank you for the referral. I had the pleasure of seeing Carmen Birmingham today in cardiology clinic. I've attached my visit note below. If you have any questions please feel free to give me a call anytime.      Matt Cleveland MD, PhD, [facility]  Cardiology and Lipidology  [facility]                                                                  PROGRESS NOTE:  Chief Complaint   Patient presents with   • Follow-Up       This patient is an established female who is here today to discuss:  Recheck PFT, SOB, CAD and cardiac w/u's;     Patient Active Problem List    Diagnosis Date Noted   • Osteoporosis 04/07/2016   • Hyperlipidemia 04/07/2016   • Esophageal reflux 04/07/2016   • Essential hypertension 04/07/2016   • SOB (shortness of breath) 04/07/2016   • ASCVD (arteriosclerotic cardiovascular disease) 04/07/2016       Past Medical History   Diagnosis Date   • Hyperlipidemia    • Hypertension    • Osteoporosis      Past Surgical History   Procedure Laterality Date   • Other       Heart Attack 2013       Current Outpatient Prescriptions   Medication Sig Dispense Refill   • ezetimibe (ZETIA) 10 MG Tab Take 1 Tab by mouth every day. 30 Tab 11   • celecoxib (CELEBREX) 100 MG Cap Take 1 Cap by mouth 2 times a day. 60 Cap 3   • losartan (COZAAR) 50 MG Tab Take 1 Tab by mouth every day. 90 Tab 3   • atorvastatin (LIPITOR) 40 MG Tab Take 1 Tab by mouth every evening. 90 Tab 3   • triamcinolone acetonide (KENALOG) 0.1 % Cream   3   • pantoprazole (PROTONIX) 40 MG Tablet Delayed Response Take 40 mg by mouth every day.     • metoprolol (LOPRESSOR) 25 MG Tab Take 25 mg by mouth 2 times a day.     • aspirin EC (ECOTRIN) 81 MG Tablet Delayed  "Response Take 81 mg by mouth every day.     • alendronate (FOSAMAX) 35 MG tablet Take 35 mg by mouth every 7 days.       No current facility-administered medications for this visit.     Review of patient's allergies indicates no known allergies.      Review of Systems:     Constitutional: Denies fevers, Denies weight changes  Eyes: Denies changes in vision, no eye pain  Ears/Nose/Throat/Mouth: Denies nasal congestion or sore throat   Cardiovascular: Denies chest pain or palpitations   Respiratory: PYLE; shortness of breath , Denies cough  Gastrointestinal/Hepatic: Denies abdominal pain, nausea, vomiting, diarrhea, constipation or GI bleeding   Genitourinary: Denies bladder dysfunction, dysuria or frequency  Musculoskeletal/Rheum: Knee,   joint pain and swelling   Skin/Breast: Denies rash, denies breast lumps or discharge  Neurological: Denies headache, confusion, memory loss or focal weakness/parasthesias  Psychiatric: denies mood disorder   Endocrine: denies hx of diabetes or thyroid dysfunction  Heme/Oncology/Lymph Nodes: Denies enlarged lymph nodes, denies brusing or known bleeding disorder  Allergic/Immunologic: Denies hx of allergies      All other systems were reviewed and are negative (AMA/CMS criteria)      Blood pressure 112/64, pulse 73, height 1.626 m (5' 4\"), weight 72.122 kg (159 lb), SpO2 93 %.  General Appearance:   Well developed, Well nourished, No acute distress, Non-toxic appearance.    HENT:  Normocephalic, Atraumatic, Oropharynx moist mucous membranes, Dentition: Mallampati 4 OP, Nose normal.    Eyes:  PERRLA, EOMI, Conjunctiva normal, No discharge.  Neck:  Normal range of motion, No cervical tenderness, Supple, No stridor, no  JVD .  No thyromegaly.  No carotid bruit.  Cardiovascular:  Normal heart rate, Normal rhythm, loud S1, S2, no S3,  S4; No gallops; No murmurs, No rubs, .   Extremitites with intact distal pulses, no cyanosis, clubbing or edema.  No heaves, thrills, HJR;  Peripheral pulses: " carotid 2+, brachial 2+, radial 2+, ulnar 2+, femoral 2+, popliteal 2+, PT 2+, DP 2+;  Lungs:  Respiratory effort is normal. Normal breath sounds, breath sounds clear to auscultation bilaterally,  no rales, no rhonchi, no wheezing.   Abdomen: Bowel sounds normal, Soft, No tenderness, No guarding, No rebound, No masses, No hepatosplenomegaly.  Skin: Warm, Dry, No erythema, No rash, no induration or crepitus.  Neurologic: Alert & oriented x 3, Normal motor function, Normal sensory function, No focal deficits noted, cranial nerves II through XII are normal,  normal gait.  Psychiatric: Affect normal, Judgment normal, Mood normal    Stress: 1.  No evidence for Lexiscan induced ischemia or infarction.  2.  There is mild inferior wall hypokinesis.  The remaining myocardial walls contract normally.  3.  Ejection fraction 60%.      Assessment and Plan.   72 y.o. female has main c/o sob; Reviewed PFT and may need trial of inhaler; Mod COPD and may have beginning of emphysema; Cardiac eval was neg for ischemia;  Reassured; Zetia generic used and recheck FLP in 2 more months;     1. ASCVD (arteriosclerotic cardiovascular disease)  asx    2. Old MI (myocardial infarction)  asx    3. Hyperlipidemia, unspecified hyperlipidemia type  recheck    4. Stented coronary artery  asx    5. Chronic obstructive pulmonary disease, unspecified COPD type (CMS-HCC)  Reviewed, see above      1. ASCVD (arteriosclerotic cardiovascular disease)     2. Old MI (myocardial infarction)     3. Hyperlipidemia, unspecified hyperlipidemia type     4. Stented coronary artery     5. Chronic obstructive pulmonary disease, unspecified COPD type (CMS-HCC)      Moderate             Guy Dick M.D.  [address] [facility] [address]  [facility] [address]  VIA In Basket

## 2017-03-03 DIAGNOSIS — Z95.5 STENTED CORONARY ARTERY: ICD-10-CM

## 2017-03-03 DIAGNOSIS — I10 ESSENTIAL HYPERTENSION: ICD-10-CM

## 2017-03-03 RX ORDER — LOSARTAN POTASSIUM 50 MG/1
TABLET ORAL
Qty: 90 TAB | Refills: 3 | Status: SHIPPED | OUTPATIENT
Start: 2017-03-03 | End: 2018-03-13 | Stop reason: SDUPTHER

## 2017-03-10 ENCOUNTER — TELEPHONE (OUTPATIENT)
Dept: CARDIOLOGY | Facility: MEDICAL CENTER | Age: 72
End: 2017-03-10

## 2017-03-10 DIAGNOSIS — E78.49 OTHER HYPERLIPIDEMIA: ICD-10-CM

## 2017-03-10 NOTE — TELEPHONE ENCOUNTER
BRAND Zetia is on patient's formulary, generic is not covered under patient's formulary  Okay to switch patient to BRAND?    Patient's plan [employer]  ID#[id number]

## 2017-03-13 RX ORDER — EZETIMIBE 10 MG/1
10 TABLET ORAL DAILY
Qty: 90 TAB | Refills: 3 | Status: SHIPPED | OUTPATIENT
Start: 2017-03-13 | End: 2017-04-17 | Stop reason: SDUPTHER

## 2017-04-11 DIAGNOSIS — E78.5 HYPERLIPIDEMIA, UNSPECIFIED HYPERLIPIDEMIA TYPE: ICD-10-CM

## 2017-04-12 RX ORDER — ATORVASTATIN CALCIUM 40 MG/1
TABLET, FILM COATED ORAL
Qty: 90 TAB | Refills: 3 | Status: SHIPPED | OUTPATIENT
Start: 2017-04-12 | End: 2018-03-13

## 2017-04-17 DIAGNOSIS — E78.49 OTHER HYPERLIPIDEMIA: ICD-10-CM

## 2017-04-17 RX ORDER — EZETIMIBE 10 MG/1
10 TABLET ORAL DAILY
Qty: 90 TAB | Refills: 3
Start: 2017-04-17 | End: 2018-01-30 | Stop reason: SDUPTHER

## 2017-05-23 ENCOUNTER — OFFICE VISIT (OUTPATIENT)
Dept: CARDIOLOGY | Facility: CLINIC | Age: 72
End: 2017-05-23
Payer: MEDICARE

## 2017-05-23 VITALS
HEART RATE: 78 BPM | WEIGHT: 151 LBS | DIASTOLIC BLOOD PRESSURE: 70 MMHG | SYSTOLIC BLOOD PRESSURE: 120 MMHG | BODY MASS INDEX: 25.78 KG/M2 | OXYGEN SATURATION: 92 % | HEIGHT: 64 IN

## 2017-05-23 DIAGNOSIS — I25.10 ASCVD (ARTERIOSCLEROTIC CARDIOVASCULAR DISEASE): ICD-10-CM

## 2017-05-23 DIAGNOSIS — R09.02 HYPOXEMIA: ICD-10-CM

## 2017-05-23 DIAGNOSIS — I25.2 OLD MI (MYOCARDIAL INFARCTION): ICD-10-CM

## 2017-05-23 DIAGNOSIS — R06.09 DOE (DYSPNEA ON EXERTION): Primary | ICD-10-CM

## 2017-05-23 DIAGNOSIS — E78.5 HYPERLIPIDEMIA, UNSPECIFIED HYPERLIPIDEMIA TYPE: ICD-10-CM

## 2017-05-23 DIAGNOSIS — I10 ESSENTIAL HYPERTENSION: ICD-10-CM

## 2017-05-23 DIAGNOSIS — J44.9 CHRONIC OBSTRUCTIVE PULMONARY DISEASE, UNSPECIFIED COPD TYPE (HCC): ICD-10-CM

## 2017-05-23 DIAGNOSIS — G47.34 NOCTURNAL HYPOXEMIA: ICD-10-CM

## 2017-05-23 DIAGNOSIS — Z95.5 STENTED CORONARY ARTERY: ICD-10-CM

## 2017-05-23 PROCEDURE — 99214 OFFICE O/P EST MOD 30 MIN: CPT | Performed by: INTERNAL MEDICINE

## 2017-05-23 PROCEDURE — 3017F COLORECTAL CA SCREEN DOC REV: CPT | Mod: 8P | Performed by: INTERNAL MEDICINE

## 2017-05-23 PROCEDURE — G8419 CALC BMI OUT NRM PARAM NOF/U: HCPCS | Performed by: INTERNAL MEDICINE

## 2017-05-23 PROCEDURE — G8432 DEP SCR NOT DOC, RNG: HCPCS | Performed by: INTERNAL MEDICINE

## 2017-05-23 PROCEDURE — 4040F PNEUMOC VAC/ADMIN/RCVD: CPT | Mod: 8P | Performed by: INTERNAL MEDICINE

## 2017-05-23 PROCEDURE — 1101F PT FALLS ASSESS-DOCD LE1/YR: CPT | Mod: 8P | Performed by: INTERNAL MEDICINE

## 2017-05-23 PROCEDURE — G8598 ASA/ANTIPLAT THER USED: HCPCS | Performed by: INTERNAL MEDICINE

## 2017-05-23 PROCEDURE — 3014F SCREEN MAMMO DOC REV: CPT | Mod: 8P | Performed by: INTERNAL MEDICINE

## 2017-05-23 PROCEDURE — 1036F TOBACCO NON-USER: CPT | Performed by: INTERNAL MEDICINE

## 2017-05-23 NOTE — PROGRESS NOTES
Chief Complaint   Patient presents with   • Follow-Up       This patient is an established female who is here today to discuss:  Follow up and eval for SOB, PYLE; former smoker; Test result discussion    Patient Active Problem List    Diagnosis Date Noted   • Osteoporosis 04/07/2016   • Hyperlipidemia 04/07/2016   • Esophageal reflux 04/07/2016   • Essential hypertension 04/07/2016   • SOB (shortness of breath) 04/07/2016   • ASCVD (arteriosclerotic cardiovascular disease) 04/07/2016       Past Medical History   Diagnosis Date   • Hyperlipidemia    • Hypertension    • Osteoporosis      Past Surgical History   Procedure Laterality Date   • Other       Heart Attack 2013       Current Outpatient Prescriptions   Medication Sig Dispense Refill   • ezetimibe (ZETIA) 10 MG Tab Take 1 Tab by mouth every day. 90 Tab 3   • atorvastatin (LIPITOR) 40 MG Tab TAKE 1 TABLET EVERY EVENING 90 Tab 3   • losartan (COZAAR) 50 MG Tab TAKE 1 TABLET EVERY DAY 90 Tab 3   • celecoxib (CELEBREX) 100 MG Cap Take 1 Cap by mouth 2 times a day. 60 Cap 3   • pantoprazole (PROTONIX) 40 MG Tablet Delayed Response Take 40 mg by mouth every day.     • metoprolol (LOPRESSOR) 25 MG Tab Take 25 mg by mouth 2 times a day.     • aspirin EC (ECOTRIN) 81 MG Tablet Delayed Response Take 81 mg by mouth every day.     • triamcinolone acetonide (KENALOG) 0.1 % Cream   3   • alendronate (FOSAMAX) 35 MG tablet Take 35 mg by mouth every 7 days.       No current facility-administered medications for this visit.     Review of patient's allergies indicates no known allergies.      Review of Systems:   See above  Constitutional: Denies fevers, Decreased  weight changes  Eyes: Denies changes in vision, no eye pain  Ears/Nose/Throat/Mouth: Denies nasal congestion or sore throat   Cardiovascular: Denies chest pain or palpitations   Respiratory: PYLE;  shortness of breath , Denies cough  Gastrointestinal/Hepatic: Denies abdominal pain, nausea, vomiting, diarrhea,  "constipation or GI bleeding   Genitourinary: Denies bladder dysfunction, dysuria or frequency  Musculoskeletal/Rheum: Denies  joint pain and swelling   Skin/Breast: Denies rash, denies breast lumps or discharge  Neurological: Denies headache, confusion, memory loss or focal weakness/parasthesias  Psychiatric: denies mood disorder   Endocrine: denies hx of diabetes or thyroid dysfunction  Heme/Oncology/Lymph Nodes: Denies enlarged lymph nodes, denies brusing or known bleeding disorder  Allergic/Immunologic: Denies hx of allergies      All other systems were reviewed and are negative (AMA/CMS criteria)      Blood pressure 120/70, pulse 78, height 1.626 m (5' 4\"), weight 68.493 kg (151 lb), SpO2 92 %.  General Appearance:   Well developed, Well nourished, No acute distress, Non-toxic appearance.    HENT:  Normocephalic, Atraumatic, Oropharynx moist mucous membranes, Dentition: Mallampati 4 OP, Nose normal.    Eyes:  PERRLA, EOMI, Conjunctiva normal, No discharge.  Neck:  Normal range of motion, No cervical tenderness, Supple, No stridor, no  JVD .  No thyromegaly.  No carotid bruit.  Cardiovascular:  Normal heart rate, Normal rhythm, loud S1, S2, no S3,  S4; No gallops; No murmurs, No rubs, .   Extremitites with intact distal pulses, no cyanosis, clubbing or edema.  No heaves, thrills, HJR;  Peripheral pulses: carotid 2+, brachial 2+, radial 2+, ulnar 2+, femoral 2+, popliteal 2+, PT 2+, DP 2+;  Lungs:  Respiratory effort is normal. Normal breath sounds, breath sounds clear to auscultation bilaterally,  no rales, no rhonchi, no wheezing.   Abdomen: Bowel sounds normal, Soft, No tenderness, No guarding, No rebound, No masses, No hepatosplenomegaly.  Skin: Warm, Dry, No erythema, No rash, no induration or crepitus.  Neurologic: Alert & oriented x 3, Normal motor function, Normal sensory function, No focal deficits noted, cranial nerves II through XII are normal,  normal gait.  Psychiatric: Affect normal, Judgment normal, " Mood normal    Results for ELANA NOVOA (MRN [id number]) as of 5/23/2017 15:35   Ref. Range 1/26/2017 10:43 1/31/2017 11:39 2/2/2017 00:00 4/27/2017 10:25   WBC Latest Ref Range: 4.8-10.8 K/uL    6.0   RBC Latest Ref Range: 4.20-5.40 M/uL    4.26   Hemoglobin Latest Ref Range: 13.0-17.0 g/dL    13.9   Hematocrit Latest Ref Range: 39.0-50.0 %    42.3   MCV Latest Ref Range: 81.0-99.0 fL    99.3 (H)   MCH Latest Ref Range: 27.0-31.0 pg    32.6 (H)   MCHC Latest Ref Range: 33.0-37.0 g/dL    32.9 (L)   RDW Latest Ref Range: 11.5-14.5 %    12.1   Platelet Count Latest Ref Range: 130-400 K/uL    229   MPV Latest Ref Range: 7.4-10.4 fL    10.7 (H)   Neutrophils Automated Latest Ref Range: 39.0-70.0 %    47.1   Abs Neutrophils Automated Latest Ref Range: 1.8-7.7 K/uL    2.8   Lymphocytes Automated Latest Ref Range: 21.0-50.0 %    40.8   Abs Lymph Automated Latest Ref Range: 1.2-4.8 K/uL    2.4   Eosinophils Automated Latest Ref Range: 0.0-5.0 %    2.3   Basophils Automated Latest Ref Range: 0.0-3.0 %    0.7   Monocytes Automated Latest Ref Range: 2.0-9.0 %    8.9   Eosinophil Count, Blood Latest Ref Range: 0.00-0.50 K/uL    0.14   Sodium Latest Ref Range: 136-145 mmol/L    144   Potassium Latest Ref Range: 3.5-5.1 mmol/L    4.5   Chloride Latest Ref Range:  mmol/L    108 (H)   Co2 Latest Ref Range: 21-32 mmol/L    27   Anion Gap Latest Ref Range: 10-18 mmol/L    14   Glucose Latest Ref Range: 74-99 mg/dL    96   Bun Latest Ref Range: 7-18 mg/dL    14   Creatinine Latest Ref Range: 0.6-1.0 mg/dL    0.8   GFR If  Latest Ref Range: >60 mL/min/1.73 m 2    >60   GFR If Non  Latest Ref Range: >60 mL/min/1.73 m 2    >60   Calcium Latest Ref Range: 8.5-11.0 mg/dL    9.3   AST(SGOT) Latest Ref Range: 15-37 U/L    27   ALT(SGPT) Latest Ref Range: 12-78 U/L    24   Alkaline Phosphatase Latest Ref Range:  U/L    51   Total Bilirubin Latest Ref Range: 0.2-1.0 mg/dL    0.7   Albumin Latest Ref  Range: 3.4-5.0 g/dL    3.5   Total Protein Latest Ref Range: 6.4-8.2 g/dL    7.1   A-G Ratio Unknown    1.0   Cholesterol,Tot Latest Ref Range: 120-200 mg/dL    124   Triglycerides Latest Ref Range: 0-150 mg/dL    62   HDL Latest Ref Range: 40.0-60.0 mg/dL    34.0 (L)   Non HDL Cholesterol Latest Ref Range:      90   LDL Latest Ref Range: <100 mg/dL    78   Chol-Hdl Ratio Unknown    3.65   TSH Latest Ref Range: 0.36-3.74 uIU/mL    0.81   Free T-4 Latest Ref Range: 0.76-1.46 ng/dL    1.04   NM-HEART MUSCLE IMAGE,SPECT MULT Unknown  Attch     ECHOCARDIOGRAM COMP W/O CONT Unknown   Attch      Assessment and Plan.   72 y.o. female has PYLE and result showed moderate COPD and diffusion deficit; She may benefit from bronchodilator; She is happy to know we found her etiology of PYLE;  All recent labs, imaging studies and procedures reviewed    1. ASCVD (arteriosclerotic cardiovascular disease)  asx    2. Old MI (myocardial infarction)  asx    3. Stented coronary artery  asx    4. Hyperlipidemia, unspecified hyperlipidemia type  recheck    5. Essential hypertension  controlled    6. Nocturnal hypoxemia  On O2    7. PYLE (dyspnea on exertion)  See above      1. ASCVD (arteriosclerotic cardiovascular disease)     2. Old MI (myocardial infarction)     3. Stented coronary artery     4. Hyperlipidemia, unspecified hyperlipidemia type     5. Essential hypertension     6. Nocturnal hypoxemia     7. PYLE (dyspnea on exertion)

## 2017-05-23 NOTE — MR AVS SNAPSHOT
"        Carmen Birmingham   2017 3:20 PM   Office Visit   MRN: [id number]    [facility]   Dept Phone:  [phone]    Description:  Female : 1945   Provider:  Matt Cleveland MD,[facility]           Reason for Visit     Follow-Up           Allergies as of 2017     No Known Allergies      You were diagnosed with     PYLE (dyspnea on exertion)   [495749]  -  Primary     ASCVD (arteriosclerotic cardiovascular disease)   [803859]       Old MI (myocardial infarction)   [598125]       Stented coronary artery   [130273]       Hyperlipidemia, unspecified hyperlipidemia type   [8711829]       Essential hypertension   [4091289]       Nocturnal hypoxemia   [376366]       Chronic obstructive pulmonary disease, unspecified COPD type (CMS-formerly [facility])   [8510645]       Hypoxemia   [799.02.ICD-9-CM]         Vital Signs     Blood Pressure Pulse Height Weight Body Mass Index Oxygen Saturation    120/70 mmHg 78 1.626 m (5' 4\") 68.493 kg (151 lb) 25.91 kg/m2 92%    Smoking Status                   Former Smoker           Basic Information     Date Of Birth Sex Race Ethnicity Preferred Language    1945 Female White Non- English      Your appointments     May 31, 2017  9:15 AM   Annual Exam with Guy Dick M.D.   [facility] (--)    [address] [place] [address]   [phone]            Oct 26, 2017 12:40 PM   FOLLOW UP with [facility] (--)    [address] [place] [address]  [address] [facility] [address] [phone]              Problem List              ICD-10-CM Priority Class Noted - Resolved    Osteoporosis M81.0   2016 - Present    Hyperlipidemia E78.5   2016 - Present    Esophageal reflux K21.9   2016 - Present    Essential hypertension I10   2016 - Present    SOB (shortness of breath) R06.02   2016 - Present    ASCVD (arteriosclerotic cardiovascular disease) I25.10   2016 - " Present      Health Maintenance        Date Due Completion Dates    IMM DTaP/Tdap/Td Vaccine (1 - Tdap) 1/17/1963 ---    PAP SMEAR 1/17/1965 ---    MAMMOGRAM 1/17/1984 ---    COLONOSCOPY 1/17/1994 ---    IMM ZOSTER VACCINE 1/17/2004 ---    BONE DENSITY 1/17/2009 ---    IMM PNEUMOCOCCAL 65+ (ADULT) LOW/MEDIUM RISK SERIES (1 of 2 - PCV13) 1/17/2009 ---            Current Immunizations     Influenza Vaccine Quad Inj (Pf) 10/20/2016 11:00 AM      Below and/or attached are the medications your provider expects you to take. Review all of your home medications and newly ordered medications with your provider and/or pharmacist. Follow medication instructions as directed by your provider and/or pharmacist. Please keep your medication list with you and share with your provider. Update the information when medications are discontinued, doses are changed, or new medications (including over-the-counter products) are added; and carry medication information at all times in the event of emergency situations     Allergies:  No Known Allergies          Medications  Valid as of: May 23, 2017 -  4:09 PM    Generic Name Brand Name Tablet Size Instructions for use    Alendronate Sodium (Tab) FOSAMAX 35 MG Take 35 mg by mouth every 7 days.        Aspirin (Tablet Delayed Response) ECOTRIN 81 MG Take 81 mg by mouth every day.        Atorvastatin Calcium (Tab) LIPITOR 40 MG TAKE 1 TABLET EVERY EVENING        Celecoxib (Cap) CELEBREX 100 MG Take 1 Cap by mouth 2 times a day.        Ezetimibe (Tab) ZETIA 10 MG Take 1 Tab by mouth every day.        Losartan Potassium (Tab) COZAAR 50 MG TAKE 1 TABLET EVERY DAY        Metoprolol Tartrate (Tab) LOPRESSOR 25 MG Take 25 mg by mouth 2 times a day.        Pantoprazole Sodium (Tablet Delayed Response) PROTONIX 40 MG Take 40 mg by mouth every day.        Triamcinolone Acetonide (Cream) KENALOG 0.1 %         .                 Medicines prescribed today were sent to:     [facility] [place] [facility] [address]    [address] [place] [address]    Phone: [phone] Fax: [phone]    Open 24 Hours?: No    [facility] - [place], [place] - [address]    [address] [address]    Phone: [phone] Fax: [phone]    Open 24 Hours?: No      Medication refill instructions:       If your prescription bottle indicates you have medication refills left, it is not necessary to call your provider’s office. Please contact your pharmacy and they will refill your medication.    If your prescription bottle indicates you do not have any refills left, you may request refills at any time through one of the following ways: The online [employer] system (except Urgent Care), by calling your provider’s office, or by asking your pharmacy to contact your provider’s office with a refill request. Medication refills are processed only during regular business hours and may not be available until the next business day. Your provider may request additional information or to have a follow-up visit with you prior to refilling your medication.   *Please Note: Medication refills are assigned a new Rx number when refilled electronically. Your pharmacy may indicate that no refills were authorized even though a new prescription for the same medication is available at the pharmacy. Please request the medicine by name with the pharmacy before contacting your provider for a refill.        Referral     A referral request has been sent to our patient care coordination department. Please allow 3-5 business days for us to process this request and contact you either by phone or mail. If you do not hear from us by the 5th business day, please call us at [phone].           CraigsBlueBook Access Code: [email or web address]  Expires: 6/22/2017  4:09 PM    CraigsBlueBook  A secure, online tool to manage your health information     [employer]’s CraigsBlueBook® is a secure, online tool that connects you  to your personalized health information from the privacy of your home -- day or night - making it very easy for you to manage your healthcare. Once the activation process is completed, you can even access your medical information using the Sophie & Juliet addie, which is available for free in the Apple Addie store or Google Play store.     Sophie & Juliet provides the following levels of access (as shown below):   My Chart Features   [facility] Doctor [facility]  Specialists [facility]  Urgent  Care Non-[facility]  Primary Care  Doctor   Email your healthcare team securely and privately 24/7 X X X    Manage appointments: schedule your next appointment; view details of past/upcoming appointments X      Request prescription refills. X      View recent personal medical records, including lab and immunizations X X X X   View health record, including health history, allergies, medications X X X X   Read reports about your outpatient visits, procedures, consult and ER notes X X X X   See your discharge summary, which is a recap of your hospital and/or ER visit that includes your diagnosis, lab results, and care plan. X X       How to register for Sophie [employer] Juliet:  1. Go to  [email or web address].  2. Click on the Sign Up Now box, which takes you to the New Member Sign Up page. You will need to provide the following information:  a. Enter your Sophie & Juliet Access Code exactly as it appears at the top of this page. (You will not need to use this code after you’ve completed the sign-up process. If you do not sign up before the expiration date, you must request a new code.)   b. Enter your date of birth.   c. Enter your home email address.   d. Click Submit, and follow the next screen’s instructions.  3. Create a Sophie & Juliet ID. This will be your Sophie & Juliet login ID and cannot be changed, so think of one that is secure and easy to remember.  4. Create a Sophie [employer] Juliet password. You can change your password at any time.  5. Enter your Password Reset Question and Answer.  This can be used at a later time if you forget your password.   6. Enter your e-mail address. This allows you to receive e-mail notifications when new information is available in [employer].  7. Click Sign Up. You can now view your health information.    For assistance activating your [employer] account, call [phone]

## 2017-05-23 NOTE — Clinical Note
[facility] [address],   [address]nd [address]  [place] [address]  Phone: [phone]  Fax: [phone]              Carmen Birmingham  1945    Encounter Date: 5/23/2017    Guy Dick M.D.    Thank you for the referral. I had the pleasure of seeing Carmen Birmingham today in cardiology clinic. I've attached my visit note below. If you have any questions please feel free to give me a call anytime.      Matt Cleveland MD, PhD, [facility]  Cardiology and Lipidology  [facility]                                                                PROGRESS NOTE:  Chief Complaint   Patient presents with   • Follow-Up       This patient is an established female who is here today to discuss:  Follow up and eval for SOB, PYLE; former smoker; Test result discussion    Patient Active Problem List    Diagnosis Date Noted   • Osteoporosis 04/07/2016   • Hyperlipidemia 04/07/2016   • Esophageal reflux 04/07/2016   • Essential hypertension 04/07/2016   • SOB (shortness of breath) 04/07/2016   • ASCVD (arteriosclerotic cardiovascular disease) 04/07/2016       Past Medical History   Diagnosis Date   • Hyperlipidemia    • Hypertension    • Osteoporosis      Past Surgical History   Procedure Laterality Date   • Other       Heart Attack 2013       Current Outpatient Prescriptions   Medication Sig Dispense Refill   • ezetimibe (ZETIA) 10 MG Tab Take 1 Tab by mouth every day. 90 Tab 3   • atorvastatin (LIPITOR) 40 MG Tab TAKE 1 TABLET EVERY EVENING 90 Tab 3   • losartan (COZAAR) 50 MG Tab TAKE 1 TABLET EVERY DAY 90 Tab 3   • celecoxib (CELEBREX) 100 MG Cap Take 1 Cap by mouth 2 times a day. 60 Cap 3   • pantoprazole (PROTONIX) 40 MG Tablet Delayed Response Take 40 mg by mouth every day.     • metoprolol (LOPRESSOR) 25 MG Tab Take 25 mg by mouth 2 times a day.     • aspirin EC (ECOTRIN) 81 MG Tablet Delayed Response Take 81 mg by mouth every day.     "  • triamcinolone acetonide (KENALOG) 0.1 % Cream   3   • alendronate (FOSAMAX) 35 MG tablet Take 35 mg by mouth every 7 days.       No current facility-administered medications for this visit.     Review of patient's allergies indicates no known allergies.      Review of Systems:   See above  Constitutional: Denies fevers, Decreased  weight changes  Eyes: Denies changes in vision, no eye pain  Ears/Nose/Throat/Mouth: Denies nasal congestion or sore throat   Cardiovascular: Denies chest pain or palpitations   Respiratory: PYLE;  shortness of breath , Denies cough  Gastrointestinal/Hepatic: Denies abdominal pain, nausea, vomiting, diarrhea, constipation or GI bleeding   Genitourinary: Denies bladder dysfunction, dysuria or frequency  Musculoskeletal/Rheum: Denies  joint pain and swelling   Skin/Breast: Denies rash, denies breast lumps or discharge  Neurological: Denies headache, confusion, memory loss or focal weakness/parasthesias  Psychiatric: denies mood disorder   Endocrine: denies hx of diabetes or thyroid dysfunction  Heme/Oncology/Lymph Nodes: Denies enlarged lymph nodes, denies brusing or known bleeding disorder  Allergic/Immunologic: Denies hx of allergies      All other systems were reviewed and are negative (AMA/CMS criteria)      Blood pressure 120/70, pulse 78, height 1.626 m (5' 4\"), weight 68.493 kg (151 lb), SpO2 92 %.  General Appearance:   Well developed, Well nourished, No acute distress, Non-toxic appearance.    HENT:  Normocephalic, Atraumatic, Oropharynx moist mucous membranes, Dentition: Mallampati 4 OP, Nose normal.    Eyes:  PERRLA, EOMI, Conjunctiva normal, No discharge.  Neck:  Normal range of motion, No cervical tenderness, Supple, No stridor, no  JVD .  No thyromegaly.  No carotid bruit.  Cardiovascular:  Normal heart rate, Normal rhythm, loud S1, S2, no S3,  S4; No gallops; No murmurs, No rubs, .   Extremitites with intact distal pulses, no cyanosis, clubbing or edema.  No heaves, " thrills, HJR;  Peripheral pulses: carotid 2+, brachial 2+, radial 2+, ulnar 2+, femoral 2+, popliteal 2+, PT 2+, DP 2+;  Lungs:  Respiratory effort is normal. Normal breath sounds, breath sounds clear to auscultation bilaterally,  no rales, no rhonchi, no wheezing.   Abdomen: Bowel sounds normal, Soft, No tenderness, No guarding, No rebound, No masses, No hepatosplenomegaly.  Skin: Warm, Dry, No erythema, No rash, no induration or crepitus.  Neurologic: Alert & oriented x 3, Normal motor function, Normal sensory function, No focal deficits noted, cranial nerves II through XII are normal,  normal gait.  Psychiatric: Affect normal, Judgment normal, Mood normal    Results for ELANA NOVOA (MRN [id number]) as of 5/23/2017 15:35   Ref. Range 1/26/2017 10:43 1/31/2017 11:39 2/2/2017 00:00 4/27/2017 10:25   WBC Latest Ref Range: 4.8-10.8 K/uL    6.0   RBC Latest Ref Range: 4.20-5.40 M/uL    4.26   Hemoglobin Latest Ref Range: 13.0-17.0 g/dL    13.9   Hematocrit Latest Ref Range: 39.0-50.0 %    42.3   MCV Latest Ref Range: 81.0-99.0 fL    99.3 (H)   MCH Latest Ref Range: 27.0-31.0 pg    32.6 (H)   MCHC Latest Ref Range: 33.0-37.0 g/dL    32.9 (L)   RDW Latest Ref Range: 11.5-14.5 %    12.1   Platelet Count Latest Ref Range: 130-400 K/uL    229   MPV Latest Ref Range: 7.4-10.4 fL    10.7 (H)   Neutrophils Automated Latest Ref Range: 39.0-70.0 %    47.1   Abs Neutrophils Automated Latest Ref Range: 1.8-7.7 K/uL    2.8   Lymphocytes Automated Latest Ref Range: 21.0-50.0 %    40.8   Abs Lymph Automated Latest Ref Range: 1.2-4.8 K/uL    2.4   Eosinophils Automated Latest Ref Range: 0.0-5.0 %    2.3   Basophils Automated Latest Ref Range: 0.0-3.0 %    0.7   Monocytes Automated Latest Ref Range: 2.0-9.0 %    8.9   Eosinophil Count, Blood Latest Ref Range: 0.00-0.50 K/uL    0.14   Sodium Latest Ref Range: 136-145 mmol/L    144   Potassium Latest Ref Range: 3.5-5.1 mmol/L    4.5   Chloride Latest Ref Range:  mmol/L    108 (H)    Co2 Latest Ref Range: 21-32 mmol/L    27   Anion Gap Latest Ref Range: 10-18 mmol/L    14   Glucose Latest Ref Range: 74-99 mg/dL    96   Bun Latest Ref Range: 7-18 mg/dL    14   Creatinine Latest Ref Range: 0.6-1.0 mg/dL    0.8   GFR If  Latest Ref Range: >60 mL/min/1.73 m 2    >60   GFR If Non  Latest Ref Range: >60 mL/min/1.73 m 2    >60   Calcium Latest Ref Range: 8.5-11.0 mg/dL    9.3   AST(SGOT) Latest Ref Range: 15-37 U/L    27   ALT(SGPT) Latest Ref Range: 12-78 U/L    24   Alkaline Phosphatase Latest Ref Range:  U/L    51   Total Bilirubin Latest Ref Range: 0.2-1.0 mg/dL    0.7   Albumin Latest Ref Range: 3.4-5.0 g/dL    3.5   Total Protein Latest Ref Range: 6.4-8.2 g/dL    7.1   A-G Ratio Unknown    1.0   Cholesterol,Tot Latest Ref Range: 120-200 mg/dL    124   Triglycerides Latest Ref Range: 0-150 mg/dL    62   HDL Latest Ref Range: 40.0-60.0 mg/dL    34.0 (L)   Non HDL Cholesterol Latest Ref Range:      90   LDL Latest Ref Range: <100 mg/dL    78   Chol-Hdl Ratio Unknown    3.65   TSH Latest Ref Range: 0.36-3.74 uIU/mL    0.81   Free T-4 Latest Ref Range: 0.76-1.46 ng/dL    1.04   NM-HEART MUSCLE IMAGE,SPECT MULT Unknown  Attch     ECHOCARDIOGRAM COMP W/O CONT Unknown   Attch      Assessment and Plan.   72 y.o. female has PYLE and result showed moderate COPD and diffusion deficit; She may benefit from bronchodilator; She is happy to know we found her etiology of PYLE;    1. ASCVD (arteriosclerotic cardiovascular disease)  asx    2. Old MI (myocardial infarction)  asx    3. Stented coronary artery  asx    4. Hyperlipidemia, unspecified hyperlipidemia type  recheck    5. Essential hypertension  controlled    6. Nocturnal hypoxemia  On O2    7. PYLE (dyspnea on exertion)  See above      1. ASCVD (arteriosclerotic cardiovascular disease)     2. Old MI (myocardial infarction)     3. Stented coronary artery     4. Hyperlipidemia, unspecified hyperlipidemia type     5.  Essential hypertension     6. Nocturnal hypoxemia     7. PYLE (dyspnea on exertion)               Guy Dick M.D.  [address]  [place] [address]  VIA In Basket

## 2017-05-31 PROBLEM — Z91.09 ENVIRONMENTAL ALLERGIES: Status: ACTIVE | Noted: 2017-05-31

## 2017-09-19 ENCOUNTER — OFFICE VISIT (OUTPATIENT)
Dept: CARDIOLOGY | Facility: CLINIC | Age: 72
End: 2017-09-19
Payer: MEDICARE

## 2017-09-19 VITALS
WEIGHT: 139 LBS | HEIGHT: 64 IN | DIASTOLIC BLOOD PRESSURE: 80 MMHG | OXYGEN SATURATION: 92 % | BODY MASS INDEX: 23.73 KG/M2 | HEART RATE: 70 BPM | SYSTOLIC BLOOD PRESSURE: 118 MMHG

## 2017-09-19 DIAGNOSIS — J44.9 CHRONIC OBSTRUCTIVE PULMONARY DISEASE, UNSPECIFIED COPD TYPE (HCC): ICD-10-CM

## 2017-09-19 DIAGNOSIS — E78.5 HYPERLIPIDEMIA, UNSPECIFIED HYPERLIPIDEMIA TYPE: ICD-10-CM

## 2017-09-19 DIAGNOSIS — I10 ESSENTIAL HYPERTENSION: ICD-10-CM

## 2017-09-19 DIAGNOSIS — Z95.5 STENTED CORONARY ARTERY: ICD-10-CM

## 2017-09-19 DIAGNOSIS — R06.09 DOE (DYSPNEA ON EXERTION): ICD-10-CM

## 2017-09-19 DIAGNOSIS — I25.2 OLD MI (MYOCARDIAL INFARCTION): ICD-10-CM

## 2017-09-19 DIAGNOSIS — G47.34 NOCTURNAL HYPOXEMIA: ICD-10-CM

## 2017-09-19 DIAGNOSIS — I25.10 ASCVD (ARTERIOSCLEROTIC CARDIOVASCULAR DISEASE): ICD-10-CM

## 2017-09-19 PROCEDURE — 99214 OFFICE O/P EST MOD 30 MIN: CPT | Performed by: INTERNAL MEDICINE

## 2017-09-19 NOTE — LETTER
[facility] [place] [place] [address],   [address]nd [address]  [place] [address]  Phone: [phone]  Fax: [phone]              Carmen Birmingham  1945    Encounter Date: 9/19/2017    Marshal Saba M.D.    Thank you for the referral. I had the pleasure of seeing Carmen Birmingham today in cardiology clinic. I've attached my visit note below. If you have any questions please feel free to give me a call anytime.      Matt Cleveland MD, PhD, [facility]  Cardiology and Lipidology  [facility]                                                                  PROGRESS NOTE:  Chief Complaint   Patient presents with   • Follow-Up     O2 recert         This patient is an established female who is here today to discuss:  Doing well, needs O2 recert;     Patient Active Problem List    Diagnosis Date Noted   • Environmental allergies 05/31/2017   • Osteoporosis 04/07/2016   • Hyperlipidemia 04/07/2016   • Esophageal reflux 04/07/2016   • Essential hypertension 04/07/2016   • SOB (shortness of breath) 04/07/2016   • ASCVD (arteriosclerotic cardiovascular disease) 04/07/2016       Past Medical History:   Diagnosis Date   • Hyperlipidemia    • Hypertension    • Osteoporosis      Past Surgical History:   Procedure Laterality Date   • OTHER      Heart Attack 2013     Social History     Social History   • Marital status:      Spouse name: N/A   • Number of children: N/A   • Years of education: N/A     Social History Main Topics   • Smoking status: Former Smoker   • Smokeless tobacco: Never Used   • Alcohol use Yes      Comment: occasionally   • Drug use: No   • Sexual activity: Not on file     Other Topics Concern   • Not on file     Social History Narrative   • No narrative on file     Family History   Problem Relation Age of Onset   • Heart Disease Mother        Current Outpatient Prescriptions   Medication Sig Dispense Refill   • Omega-3  Fatty Acids (FISH OIL PO) Take  by mouth.     • Cholecalciferol (VITAMIN D-3 PO) Take  by mouth.     • CALCIUM PO Take  by mouth 2 Times a Day.     • Tiotropium Bromide-Olodaterol (STIOLTO RESPIMAT) 2.5-2.5 MCG/ACT Aero Soln Inhale 2 Inhalation by mouth every day. 1 Inhaler 3   • ezetimibe (ZETIA) 10 MG Tab Take 1 Tab by mouth every day. 90 Tab 3   • atorvastatin (LIPITOR) 40 MG Tab TAKE 1 TABLET EVERY EVENING 90 Tab 3   • losartan (COZAAR) 50 MG Tab TAKE 1 TABLET EVERY DAY 90 Tab 3   • metoprolol (LOPRESSOR) 25 MG Tab Take 25 mg by mouth 2 times a day.     • aspirin EC (ECOTRIN) 81 MG Tablet Delayed Response Take 81 mg by mouth every day.     • pantoprazole (PROTONIX) 40 MG Tablet Delayed Response Take 40 mg by mouth every day.     • alendronate (FOSAMAX) 35 MG tablet Take 35 mg by mouth every 7 days.       No current facility-administered medications for this visit.      Review of patient's allergies indicates no known allergies.    Review of Systems:   No cardiac sx's  Constitutional: Denies fevers, Denies weight changes  Eyes: Denies changes in vision, no eye pain  Ears/Nose/Throat/Mouth: Denies nasal congestion or sore throat   Cardiovascular: Denies chest pain or palpitations   Respiratory: Denies shortness of breath , Denies cough  Gastrointestinal/Hepatic: Denies abdominal pain, nausea, vomiting, diarrhea, constipation or GI bleeding   Genitourinary: Denies bladder dysfunction, dysuria or frequency  Musculoskeletal/Rheum: Denies  joint pain and swelling   Skin/Breast: Denies rash, denies breast lumps or discharge  Neurological: Denies headache, confusion, memory loss or focal weakness/parasthesias  Psychiatric: denies mood disorder   Endocrine: denies hx of diabetes or thyroid dysfunction  Heme/Oncology/Lymph Nodes: Denies enlarged lymph nodes, denies brusing or known bleeding disorder  Allergic/Immunologic: Denies hx of allergies      All other systems were reviewed and are negative (AMA/CMS  "criteria)      Blood pressure 118/80, pulse 70, height 1.626 m (5' 4\"), weight 63 kg (139 lb), SpO2 92 %.  General Appearance:   Well developed, Well nourished, No acute distress, Non-toxic appearance.    HENT:  Normocephalic, Atraumatic, Oropharynx moist mucous membranes, Dentition: Mallampati 4 OP, Nose normal.    Eyes:  PERRLA, EOMI, Conjunctiva normal, No discharge.  Neck:  Normal range of motion, No cervical tenderness, Supple, No stridor, no  JVD .  No thyromegaly.  No carotid bruit.  Cardiovascular:  Normal heart rate, Normal rhythm, loud S1, S2, no S3,  S4; No gallops; No murmurs, No rubs, .   Extremitites with intact distal pulses, no cyanosis, clubbing or edema.  No heaves, thrills, HJR;  Peripheral pulses: carotid 2+, brachial 2+, radial 2+, ulnar 2+, femoral 2+, popliteal 2+, PT 2+, DP 2+;  Lungs:  Respiratory effort is normal. Normal breath sounds, breath sounds clear to auscultation bilaterally,  no rales, no rhonchi, no wheezing.   Abdomen: Bowel sounds normal, Soft, No tenderness, No guarding, No rebound, No masses, No hepatosplenomegaly.  Skin: Warm, Dry, No erythema, No rash, no induration or crepitus.  Neurologic: Alert & oriented x 3, Normal motor function, Normal sensory function, No focal deficits noted, cranial nerves II through XII are normal,  normal gait.  Psychiatric: Affect normal, Judgment normal, Mood normal    Results for ELANA NOVOA (MRN [id number]) as of 9/19/2017 12:27   Ref. Range 4/27/2017 10:25   Cholesterol,Tot Latest Ref Range: 120 - 200 mg/dL 124   Triglycerides Latest Ref Range: 0 - 150 mg/dL 62   HDL Latest Ref Range: 40.0 - 60.0 mg/dL 34.0 (L)   Non HDL Cholesterol Latest Ref Range: 30 - 160  90   LDL Latest Ref Range: <100 mg/dL 78   Chol-Hdl Ratio Unknown 3.65   TSH Latest Ref Range: 0.36 - 3.74 uIU/mL 0.81   Free T-4 Latest Ref Range: 0.76 - 1.46 ng/dL 1.04     Assessment and Plan.   72 y.o. female ***    1. ASCVD (arteriosclerotic cardiovascular disease)  ***    2. Old " MI (myocardial infarction)  ***    3. Stented coronary artery  ***    4. Hyperlipidemia, unspecified hyperlipidemia type  ***    5. PYLE (dyspnea on exertion)  ***    6. Chronic obstructive pulmonary disease, unspecified COPD type (CMS-HCC)  ***    7. Nocturnal hypoxemia  ***    8. Essential hypertension  ***      Return to clinic in  *** weeks, months    1. ASCVD (arteriosclerotic cardiovascular disease)     2. Old MI (myocardial infarction)     3. Stented coronary artery     4. Hyperlipidemia, unspecified hyperlipidemia type     5. PYLE (dyspnea on exertion)     6. Chronic obstructive pulmonary disease, unspecified COPD type (CMS-HCC)     7. Nocturnal hypoxemia     8. Essential hypertension                Marshal Saba M.D.  [address] [address]  VIA Facsimile: [phone]

## 2017-09-19 NOTE — LETTER
[facility] [place] [address],   [address]nd Floor  [place] [address]  Phone: [phone]  Fax: [phone]              Carmen Birmingham  1945    Encounter Date: 9/19/2017    Marshal Saba M.D.    Thank you for the referral. I had the pleasure of seeing Carmen Birmingham today in cardiology clinic. I've attached my visit note below. If you have any questions please feel free to give me a call anytime.      Matt Cleveland MD, PhD, [facility]  Cardiology and Lipidology  [facility]                                                                    PROGRESS NOTE:  Chief Complaint   Patient presents with   • Follow-Up     O2 recert         This patient is an established female who is here today to discuss:  Doing well, needs O2 recert; for nocturnal hypoxemia    Patient Active Problem List    Diagnosis Date Noted   • Environmental allergies 05/31/2017   • Osteoporosis 04/07/2016   • Hyperlipidemia 04/07/2016   • Esophageal reflux 04/07/2016   • Essential hypertension 04/07/2016   • SOB (shortness of breath) 04/07/2016   • ASCVD (arteriosclerotic cardiovascular disease) 04/07/2016       Past Medical History:   Diagnosis Date   • Hyperlipidemia    • Hypertension    • Osteoporosis      Past Surgical History:   Procedure Laterality Date   • OTHER      Heart Attack 2013     Social History     Social History   • Marital status:      Spouse name: N/A   • Number of children: N/A   • Years of education: N/A     Social History Main Topics   • Smoking status: Former Smoker   • Smokeless tobacco: Never Used   • Alcohol use Yes      Comment: occasionally   • Drug use: No   • Sexual activity: Not on file     Other Topics Concern   • Not on file     Social History Narrative   • No narrative on file     Family History   Problem Relation Age of Onset   • Heart Disease Mother        Current Outpatient Prescriptions   Medication Sig  Dispense Refill   • Omega-3 Fatty Acids (FISH OIL PO) Take  by mouth.     • Cholecalciferol (VITAMIN D-3 PO) Take  by mouth.     • CALCIUM PO Take  by mouth 2 Times a Day.     • Tiotropium Bromide-Olodaterol (STIOLTO RESPIMAT) 2.5-2.5 MCG/ACT Aero Soln Inhale 2 Inhalation by mouth every day. 1 Inhaler 3   • ezetimibe (ZETIA) 10 MG Tab Take 1 Tab by mouth every day. 90 Tab 3   • atorvastatin (LIPITOR) 40 MG Tab TAKE 1 TABLET EVERY EVENING 90 Tab 3   • losartan (COZAAR) 50 MG Tab TAKE 1 TABLET EVERY DAY 90 Tab 3   • metoprolol (LOPRESSOR) 25 MG Tab Take 25 mg by mouth 2 times a day.     • aspirin EC (ECOTRIN) 81 MG Tablet Delayed Response Take 81 mg by mouth every day.     • pantoprazole (PROTONIX) 40 MG Tablet Delayed Response Take 40 mg by mouth every day.     • alendronate (FOSAMAX) 35 MG tablet Take 35 mg by mouth every 7 days.       No current facility-administered medications for this visit.      Review of patient's allergies indicates no known allergies.    Review of Systems:   No cardiac sx's  Constitutional: Denies fevers, Denies weight changes  Eyes: Denies changes in vision, no eye pain  Ears/Nose/Throat/Mouth: Denies nasal congestion or sore throat   Cardiovascular: Denies chest pain or palpitations   Respiratory: Denies shortness of breath , Denies cough  Gastrointestinal/Hepatic: Denies abdominal pain, nausea, vomiting, diarrhea, constipation or GI bleeding   Genitourinary: Denies bladder dysfunction, dysuria or frequency  Musculoskeletal/Rheum: Denies  joint pain and swelling   Skin/Breast: Denies rash, denies breast lumps or discharge  Neurological: Denies headache, confusion, memory loss or focal weakness/parasthesias  Psychiatric: denies mood disorder   Endocrine: denies hx of diabetes or thyroid dysfunction  Heme/Oncology/Lymph Nodes: Denies enlarged lymph nodes, denies brusing or known bleeding disorder  Allergic/Immunologic: Denies hx of allergies      All other systems were reviewed and are  "negative (AMA/CMS criteria)      Blood pressure 118/80, pulse 70, height 1.626 m (5' 4\"), weight 63 kg (139 lb), SpO2 92 %.  General Appearance:   Well developed, Well nourished, No acute distress, Non-toxic appearance.    HENT:  Normocephalic, Atraumatic, Oropharynx moist mucous membranes, Dentition: Mallampati 4 OP, Nose normal.    Eyes:  PERRLA, EOMI, Conjunctiva normal, No discharge.  Neck:  Normal range of motion, No cervical tenderness, Supple, No stridor, no  JVD .  No thyromegaly.  No carotid bruit.  Cardiovascular:  Normal heart rate, Normal rhythm, loud S1, S2, no S3,  S4; No gallops; No murmurs, No rubs, .   Extremitites with intact distal pulses, no cyanosis, clubbing or edema.  No heaves, thrills, HJR;  Peripheral pulses: carotid 2+, brachial 2+, radial 2+, ulnar 2+, femoral 2+, popliteal 2+, PT 2+, DP 2+;  Lungs:  Respiratory effort is normal. Normal breath sounds, breath sounds clear to auscultation bilaterally,  no rales, no rhonchi, no wheezing.   Abdomen: Bowel sounds normal, Soft, No tenderness, No guarding, No rebound, No masses, No hepatosplenomegaly.  Skin: Warm, Dry, No erythema, No rash, no induration or crepitus.  Neurologic: Alert & oriented x 3, Normal motor function, Normal sensory function, No focal deficits noted, cranial nerves II through XII are normal,  normal gait.  Psychiatric: Affect normal, Judgment normal, Mood normal    Results for ELANA NOVOA (MRN [id number]) as of 9/19/2017 12:27   Ref. Range 4/27/2017 10:25   Cholesterol,Tot Latest Ref Range: 120 - 200 mg/dL 124   Triglycerides Latest Ref Range: 0 - 150 mg/dL 62   HDL Latest Ref Range: 40.0 - 60.0 mg/dL 34.0 (L)   Non HDL Cholesterol Latest Ref Range: 30 - 160  90   LDL Latest Ref Range: <100 mg/dL 78   Chol-Hdl Ratio Unknown 3.65   TSH Latest Ref Range: 0.36 - 3.74 uIU/mL 0.81   Free T-4 Latest Ref Range: 0.76 - 1.46 ng/dL 1.04     Assessment and Plan.   72 y.o. female denies cardiac sx's; O2 at night helps her overall " wellbeing;     1. ASCVD (arteriosclerotic cardiovascular disease)  asx    2. Old MI (myocardial infarction)  asx    3. Stented coronary artery  asx    4. Hyperlipidemia, unspecified hyperlipidemia type  recheck    5. PYLE (dyspnea on exertion)  better    6. Chronic obstructive pulmonary disease, unspecified COPD type (CMS-HCC)  reviewed    7. Nocturnal hypoxemia  Recert; Discussed with patient the OPO ordered, may order O2 supplement and/or  sleep evaluation if OPO findings are abnormal; the patient will be contacted in the future regarding my advisement.      8. Essential hypertension  controlled      Return to clinic in  4  months    1. ASCVD (arteriosclerotic cardiovascular disease)     2. Old MI (myocardial infarction)     3. Stented coronary artery     4. Hyperlipidemia, unspecified hyperlipidemia type     5. PYLE (dyspnea on exertion)     6. Chronic obstructive pulmonary disease, unspecified COPD type (CMS-HCC)     7. Nocturnal hypoxemia     8. Essential hypertension           Marshal Saba M.D.  [address] [address]  VIA Facsimile: [phone]

## 2017-09-19 NOTE — LETTER
[facility] [place] [address],   [address]nd Floor  [place] [address]  Phone: [phone]  Fax: [phone]              Carmen Birmingham  1945    Encounter Date: 9/19/2017    Marshal Saba M.D.    Thank you for the referral. I had the pleasure of seeing Carmen Birmingham today in cardiology clinic. I've attached my visit note below. If you have any questions please feel free to give me a call anytime.      Matt Cleveland MD, PhD, [facility]  Cardiology and Lipidology  [facility]                                                                  PROGRESS NOTE:  Chief Complaint   Patient presents with   • Follow-Up     O2 recert         This patient is an established female who is here today to discuss:  Doing well, needs O2 recert;     Patient Active Problem List    Diagnosis Date Noted   • Environmental allergies 05/31/2017   • Osteoporosis 04/07/2016   • Hyperlipidemia 04/07/2016   • Esophageal reflux 04/07/2016   • Essential hypertension 04/07/2016   • SOB (shortness of breath) 04/07/2016   • ASCVD (arteriosclerotic cardiovascular disease) 04/07/2016       Past Medical History:   Diagnosis Date   • Hyperlipidemia    • Hypertension    • Osteoporosis      Past Surgical History:   Procedure Laterality Date   • OTHER      Heart Attack 2013     Social History     Social History   • Marital status:      Spouse name: N/A   • Number of children: N/A   • Years of education: N/A     Social History Main Topics   • Smoking status: Former Smoker   • Smokeless tobacco: Never Used   • Alcohol use Yes      Comment: occasionally   • Drug use: No   • Sexual activity: Not on file     Other Topics Concern   • Not on file     Social History Narrative   • No narrative on file     Family History   Problem Relation Age of Onset   • Heart Disease Mother        Current Outpatient Prescriptions   Medication Sig Dispense Refill   • Omega-3  Fatty Acids (FISH OIL PO) Take  by mouth.     • Cholecalciferol (VITAMIN D-3 PO) Take  by mouth.     • CALCIUM PO Take  by mouth 2 Times a Day.     • Tiotropium Bromide-Olodaterol (STIOLTO RESPIMAT) 2.5-2.5 MCG/ACT Aero Soln Inhale 2 Inhalation by mouth every day. 1 Inhaler 3   • ezetimibe (ZETIA) 10 MG Tab Take 1 Tab by mouth every day. 90 Tab 3   • atorvastatin (LIPITOR) 40 MG Tab TAKE 1 TABLET EVERY EVENING 90 Tab 3   • losartan (COZAAR) 50 MG Tab TAKE 1 TABLET EVERY DAY 90 Tab 3   • metoprolol (LOPRESSOR) 25 MG Tab Take 25 mg by mouth 2 times a day.     • aspirin EC (ECOTRIN) 81 MG Tablet Delayed Response Take 81 mg by mouth every day.     • pantoprazole (PROTONIX) 40 MG Tablet Delayed Response Take 40 mg by mouth every day.     • alendronate (FOSAMAX) 35 MG tablet Take 35 mg by mouth every 7 days.       No current facility-administered medications for this visit.      Review of patient's allergies indicates no known allergies.    Review of Systems:   No cardiac sx's  Constitutional: Denies fevers, Denies weight changes  Eyes: Denies changes in vision, no eye pain  Ears/Nose/Throat/Mouth: Denies nasal congestion or sore throat   Cardiovascular: Denies chest pain or palpitations   Respiratory: Denies shortness of breath , Denies cough  Gastrointestinal/Hepatic: Denies abdominal pain, nausea, vomiting, diarrhea, constipation or GI bleeding   Genitourinary: Denies bladder dysfunction, dysuria or frequency  Musculoskeletal/Rheum: Denies  joint pain and swelling   Skin/Breast: Denies rash, denies breast lumps or discharge  Neurological: Denies headache, confusion, memory loss or focal weakness/parasthesias  Psychiatric: denies mood disorder   Endocrine: denies hx of diabetes or thyroid dysfunction  Heme/Oncology/Lymph Nodes: Denies enlarged lymph nodes, denies brusing or known bleeding disorder  Allergic/Immunologic: Denies hx of allergies      All other systems were reviewed and are negative (AMA/CMS  "criteria)      Blood pressure 118/80, pulse 70, height 1.626 m (5' 4\"), weight 63 kg (139 lb), SpO2 92 %.  General Appearance:   Well developed, Well nourished, No acute distress, Non-toxic appearance.    HENT:  Normocephalic, Atraumatic, Oropharynx moist mucous membranes, Dentition: Mallampati 4 OP, Nose normal.    Eyes:  PERRLA, EOMI, Conjunctiva normal, No discharge.  Neck:  Normal range of motion, No cervical tenderness, Supple, No stridor, no  JVD .  No thyromegaly.  No carotid bruit.  Cardiovascular:  Normal heart rate, Normal rhythm, loud S1, S2, no S3,  S4; No gallops; No murmurs, No rubs, .   Extremitites with intact distal pulses, no cyanosis, clubbing or edema.  No heaves, thrills, HJR;  Peripheral pulses: carotid 2+, brachial 2+, radial 2+, ulnar 2+, femoral 2+, popliteal 2+, PT 2+, DP 2+;  Lungs:  Respiratory effort is normal. Normal breath sounds, breath sounds clear to auscultation bilaterally,  no rales, no rhonchi, no wheezing.   Abdomen: Bowel sounds normal, Soft, No tenderness, No guarding, No rebound, No masses, No hepatosplenomegaly.  Skin: Warm, Dry, No erythema, No rash, no induration or crepitus.  Neurologic: Alert & oriented x 3, Normal motor function, Normal sensory function, No focal deficits noted, cranial nerves II through XII are normal,  normal gait.  Psychiatric: Affect normal, Judgment normal, Mood normal    Results for ELANA NOVOA (MRN [id number]) as of 9/19/2017 12:27   Ref. Range 4/27/2017 10:25   Cholesterol,Tot Latest Ref Range: 120 - 200 mg/dL 124   Triglycerides Latest Ref Range: 0 - 150 mg/dL 62   HDL Latest Ref Range: 40.0 - 60.0 mg/dL 34.0 (L)   Non HDL Cholesterol Latest Ref Range: 30 - 160  90   LDL Latest Ref Range: <100 mg/dL 78   Chol-Hdl Ratio Unknown 3.65   TSH Latest Ref Range: 0.36 - 3.74 uIU/mL 0.81   Free T-4 Latest Ref Range: 0.76 - 1.46 ng/dL 1.04     Assessment and Plan.   72 y.o. female ***    1. ASCVD (arteriosclerotic cardiovascular disease)  ***    2. Old " MI (myocardial infarction)  ***    3. Stented coronary artery  ***    4. Hyperlipidemia, unspecified hyperlipidemia type  ***    5. PYLE (dyspnea on exertion)  ***    6. Chronic obstructive pulmonary disease, unspecified COPD type (CMS-HCC)  ***    7. Nocturnal hypoxemia  ***    8. Essential hypertension  ***      Return to clinic in  *** weeks, months    1. ASCVD (arteriosclerotic cardiovascular disease)     2. Old MI (myocardial infarction)     3. Stented coronary artery     4. Hyperlipidemia, unspecified hyperlipidemia type     5. PYLE (dyspnea on exertion)     6. Chronic obstructive pulmonary disease, unspecified COPD type (CMS-HCC)     7. Nocturnal hypoxemia     8. Essential hypertension                Marshal Saba M.D.  [address] [address]  VIA Facsimile: [phone]

## 2017-09-19 NOTE — PROGRESS NOTES
Chief Complaint   Patient presents with   • Follow-Up     O2 recert         This patient is an established female who is here today to discuss:  Doing well, needs O2 recert; for nocturnal hypoxemia    Patient Active Problem List    Diagnosis Date Noted   • Environmental allergies 05/31/2017   • Osteoporosis 04/07/2016   • Hyperlipidemia 04/07/2016   • Esophageal reflux 04/07/2016   • Essential hypertension 04/07/2016   • SOB (shortness of breath) 04/07/2016   • ASCVD (arteriosclerotic cardiovascular disease) 04/07/2016       Past Medical History:   Diagnosis Date   • Hyperlipidemia    • Hypertension    • Osteoporosis      Past Surgical History:   Procedure Laterality Date   • OTHER      Heart Attack 2013     Social History     Social History   • Marital status:      Spouse name: N/A   • Number of children: N/A   • Years of education: N/A     Social History Main Topics   • Smoking status: Former Smoker   • Smokeless tobacco: Never Used   • Alcohol use Yes      Comment: occasionally   • Drug use: No   • Sexual activity: Not on file     Other Topics Concern   • Not on file     Social History Narrative   • No narrative on file     Family History   Problem Relation Age of Onset   • Heart Disease Mother        Current Outpatient Prescriptions   Medication Sig Dispense Refill   • Omega-3 Fatty Acids (FISH OIL PO) Take  by mouth.     • Cholecalciferol (VITAMIN D-3 PO) Take  by mouth.     • CALCIUM PO Take  by mouth 2 Times a Day.     • Tiotropium Bromide-Olodaterol (STIOLTO RESPIMAT) 2.5-2.5 MCG/ACT Aero Soln Inhale 2 Inhalation by mouth every day. 1 Inhaler 3   • ezetimibe (ZETIA) 10 MG Tab Take 1 Tab by mouth every day. 90 Tab 3   • atorvastatin (LIPITOR) 40 MG Tab TAKE 1 TABLET EVERY EVENING 90 Tab 3   • losartan (COZAAR) 50 MG Tab TAKE 1 TABLET EVERY DAY 90 Tab 3   • metoprolol (LOPRESSOR) 25 MG Tab Take 25 mg by mouth 2 times a day.     • aspirin EC (ECOTRIN) 81 MG Tablet Delayed Response Take 81 mg by mouth  "every day.     • pantoprazole (PROTONIX) 40 MG Tablet Delayed Response Take 40 mg by mouth every day.     • alendronate (FOSAMAX) 35 MG tablet Take 35 mg by mouth every 7 days.       No current facility-administered medications for this visit.      Review of patient's allergies indicates no known allergies.    Review of Systems:   No cardiac sx's  Constitutional: Denies fevers, Denies weight changes  Eyes: Denies changes in vision, no eye pain  Ears/Nose/Throat/Mouth: Denies nasal congestion or sore throat   Cardiovascular: Denies chest pain or palpitations   Respiratory: Denies shortness of breath , Denies cough  Gastrointestinal/Hepatic: Denies abdominal pain, nausea, vomiting, diarrhea, constipation or GI bleeding   Genitourinary: Denies bladder dysfunction, dysuria or frequency  Musculoskeletal/Rheum: Denies  joint pain and swelling   Skin/Breast: Denies rash, denies breast lumps or discharge  Neurological: Denies headache, confusion, memory loss or focal weakness/parasthesias  Psychiatric: denies mood disorder   Endocrine: denies hx of diabetes or thyroid dysfunction  Heme/Oncology/Lymph Nodes: Denies enlarged lymph nodes, denies brusing or known bleeding disorder  Allergic/Immunologic: Denies hx of allergies      All other systems were reviewed and are negative (AMA/CMS criteria)      Blood pressure 118/80, pulse 70, height 1.626 m (5' 4\"), weight 63 kg (139 lb), SpO2 92 %.  General Appearance:   Well developed, Well nourished, No acute distress, Non-toxic appearance.    HENT:  Normocephalic, Atraumatic, Oropharynx moist mucous membranes, Dentition: Mallampati 4 OP, Nose normal.    Eyes:  PERRLA, EOMI, Conjunctiva normal, No discharge.  Neck:  Normal range of motion, No cervical tenderness, Supple, No stridor, no  JVD .  No thyromegaly.  No carotid bruit.  Cardiovascular:  Normal heart rate, Normal rhythm, loud S1, S2, no S3,  S4; No gallops; No murmurs, No rubs, .   Extremitites with intact distal pulses, no " cyanosis, clubbing or edema.  No heaves, thrills, HJR;  Peripheral pulses: carotid 2+, brachial 2+, radial 2+, ulnar 2+, femoral 2+, popliteal 2+, PT 2+, DP 2+;  Lungs:  Respiratory effort is normal. Normal breath sounds, breath sounds clear to auscultation bilaterally,  no rales, no rhonchi, no wheezing.   Abdomen: Bowel sounds normal, Soft, No tenderness, No guarding, No rebound, No masses, No hepatosplenomegaly.  Skin: Warm, Dry, No erythema, No rash, no induration or crepitus.  Neurologic: Alert & oriented x 3, Normal motor function, Normal sensory function, No focal deficits noted, cranial nerves II through XII are normal,  normal gait.  Psychiatric: Affect normal, Judgment normal, Mood normal    Results for ELANA NOVOA (MRN [id number]) as of 9/19/2017 12:27   Ref. Range 4/27/2017 10:25   Cholesterol,Tot Latest Ref Range: 120 - 200 mg/dL 124   Triglycerides Latest Ref Range: 0 - 150 mg/dL 62   HDL Latest Ref Range: 40.0 - 60.0 mg/dL 34.0 (L)   Non HDL Cholesterol Latest Ref Range: 30 - 160  90   LDL Latest Ref Range: <100 mg/dL 78   Chol-Hdl Ratio Unknown 3.65   TSH Latest Ref Range: 0.36 - 3.74 uIU/mL 0.81   Free T-4 Latest Ref Range: 0.76 - 1.46 ng/dL 1.04     Assessment and Plan.   72 y.o. female denies cardiac sx's; O2 at night helps her overall wellbeing;     1. ASCVD (arteriosclerotic cardiovascular disease)  asx    2. Old MI (myocardial infarction)  asx    3. Stented coronary artery  asx    4. Hyperlipidemia, unspecified hyperlipidemia type  recheck    5. PYLE (dyspnea on exertion)  better    6. Chronic obstructive pulmonary disease, unspecified COPD type (CMS-HCC)  reviewed    7. Nocturnal hypoxemia  Recert; Discussed with patient the OPO ordered, may order O2 supplement and/or  sleep evaluation if OPO findings are abnormal; the patient will be contacted in the future regarding my advisement.      8. Essential hypertension  controlled      Return to clinic in  4  months    1. ASCVD (arteriosclerotic  cardiovascular disease)     2. Old MI (myocardial infarction)     3. Stented coronary artery     4. Hyperlipidemia, unspecified hyperlipidemia type     5. PYLE (dyspnea on exertion)     6. Chronic obstructive pulmonary disease, unspecified COPD type (CMS-[facility])     7. Nocturnal hypoxemia     8. Essential hypertension

## 2017-09-20 ENCOUNTER — TELEPHONE (OUTPATIENT)
Dept: CARDIOLOGY | Facility: MEDICAL CENTER | Age: 72
End: 2017-09-20

## 2017-09-29 ENCOUNTER — NON-PROVIDER VISIT (OUTPATIENT)
Dept: CARDIOLOGY | Facility: CLINIC | Age: 72
End: 2017-09-29
Payer: MEDICARE

## 2017-10-11 ENCOUNTER — TELEPHONE (OUTPATIENT)
Dept: CARDIOLOGY | Facility: MEDICAL CENTER | Age: 72
End: 2017-10-11

## 2017-10-11 NOTE — TELEPHONE ENCOUNTER
Discussed OPO results with patient and it appears she no longer requires oxygen. Consecutive time <88% was not even 1 minute and she does not have a qualifying diagnosis with the new medicare guidelines. She was extremely please with this news and states the night she did the OPO she didn't wear her oxygen and it is the best nights sleep she has gotten in a long time. She states she does not sleep well with the oxygen because she gets tangled up in the tubing. She requested a copy of the results be mailed to her and I informed her that I would notify vital care that we are going to d/c O2 so that they will come  equipment. She verbalized understanding and will call me with any other questions or concerns.

## 2018-01-30 DIAGNOSIS — E78.49 OTHER HYPERLIPIDEMIA: ICD-10-CM

## 2018-01-30 RX ORDER — EZETIMIBE 10 MG/1
10 TABLET ORAL DAILY
Qty: 100 TAB | Refills: 3 | Status: SHIPPED
Start: 2018-01-30 | End: 2018-03-13 | Stop reason: SDUPTHER

## 2018-01-30 RX ORDER — EZETIMIBE 10 MG/1
10 TABLET ORAL DAILY
Qty: 90 TAB | Refills: 3 | Status: SHIPPED
Start: 2018-01-30 | End: 2018-01-30 | Stop reason: SDUPTHER

## 2018-03-08 ENCOUNTER — TELEPHONE (OUTPATIENT)
Dept: CARDIOLOGY | Facility: CLINIC | Age: 73
End: 2018-03-08

## 2018-03-13 ENCOUNTER — OFFICE VISIT (OUTPATIENT)
Dept: CARDIOLOGY | Facility: CLINIC | Age: 73
End: 2018-03-13
Payer: MEDICARE

## 2018-03-13 VITALS
HEIGHT: 64 IN | HEART RATE: 75 BPM | BODY MASS INDEX: 23.56 KG/M2 | DIASTOLIC BLOOD PRESSURE: 60 MMHG | WEIGHT: 138 LBS | OXYGEN SATURATION: 95 % | SYSTOLIC BLOOD PRESSURE: 90 MMHG

## 2018-03-13 DIAGNOSIS — Z95.5 STENTED CORONARY ARTERY: ICD-10-CM

## 2018-03-13 DIAGNOSIS — I10 ESSENTIAL HYPERTENSION: ICD-10-CM

## 2018-03-13 DIAGNOSIS — E78.49 OTHER HYPERLIPIDEMIA: ICD-10-CM

## 2018-03-13 DIAGNOSIS — I25.10 ASCVD (ARTERIOSCLEROTIC CARDIOVASCULAR DISEASE): ICD-10-CM

## 2018-03-13 DIAGNOSIS — Z72.0 TOBACCO USE: Chronic | ICD-10-CM

## 2018-03-13 DIAGNOSIS — E78.5 DYSLIPIDEMIA: ICD-10-CM

## 2018-03-13 DIAGNOSIS — Z95.5 STENTED CORONARY ARTERY: Chronic | ICD-10-CM

## 2018-03-13 PROCEDURE — 99214 OFFICE O/P EST MOD 30 MIN: CPT | Performed by: INTERNAL MEDICINE

## 2018-03-13 RX ORDER — VARENICLINE TARTRATE 1 MG/1
1 TABLET, FILM COATED ORAL 2 TIMES DAILY
Qty: 30 TAB | Refills: 1 | Status: SHIPPED | OUTPATIENT
Start: 2018-03-13 | End: 2018-05-05

## 2018-03-13 RX ORDER — ROSUVASTATIN CALCIUM 20 MG/1
TABLET, COATED ORAL
COMMUNITY
Start: 2018-02-27 | End: 2018-03-13 | Stop reason: SDUPTHER

## 2018-03-13 RX ORDER — METOPROLOL SUCCINATE 50 MG/1
50 TABLET, EXTENDED RELEASE ORAL DAILY
Qty: 90 TAB | Refills: 3 | Status: SHIPPED | OUTPATIENT
Start: 2018-03-13 | End: 2018-05-05

## 2018-03-13 RX ORDER — LOSARTAN POTASSIUM 50 MG/1
50 TABLET ORAL
Qty: 90 TAB | Refills: 3 | Status: SHIPPED | OUTPATIENT
Start: 2018-03-13

## 2018-03-13 RX ORDER — ROSUVASTATIN CALCIUM 40 MG/1
40 TABLET, COATED ORAL DAILY
Qty: 90 TAB | Refills: 3 | Status: SHIPPED | OUTPATIENT
Start: 2018-03-13 | End: 2018-05-05

## 2018-03-13 RX ORDER — EZETIMIBE 10 MG/1
10 TABLET ORAL DAILY
Qty: 100 TAB | Refills: 3 | Status: SHIPPED | OUTPATIENT
Start: 2018-03-13

## 2018-03-13 ASSESSMENT — ENCOUNTER SYMPTOMS
COUGH: 0
PALPITATIONS: 0
SORE THROAT: 0
CHILLS: 0
NAUSEA: 0
BLURRED VISION: 0
DIZZINESS: 0
BRUISES/BLEEDS EASILY: 0
ABDOMINAL PAIN: 0
FEVER: 0
PND: 0
SHORTNESS OF BREATH: 0
WEAKNESS: 0
CLAUDICATION: 0
FOCAL WEAKNESS: 0
FALLS: 0

## 2018-03-13 NOTE — PROGRESS NOTES
Subjective:   Carmen Birmingham is a 73 y.o. female who presents today for follow-up of her history of coronary disease status post stenting in 2012 in the setting of [place]    Been doing okay she recently stopped smoking with the aid of Chantix    She's doing well on anti-lipid control    Past Medical History:   Diagnosis Date   • Hyperlipidemia    • Hypertension    • Osteoporosis    • Stented coronary artery - RCA and LAD 2012 in setting of MI [place]    • Tobacco use      Past Surgical History:   Procedure Laterality Date   • OTHER      Heart Attack 2013     Family History   Problem Relation Age of Onset   • Heart Disease Mother      History   Smoking Status   • Former Smoker   Smokeless Tobacco   • Never Used     No Known Allergies  Outpatient Encounter Prescriptions as of 3/13/2018   Medication Sig Dispense Refill   • rosuvastatin (CRESTOR) 40 MG tablet Take 1 Tab by mouth every day. 90 Tab 3   • ezetimibe (ZETIA) 10 MG Tab Take 1 Tab by mouth every day. 100 Tab 3   • losartan (COZAAR) 50 MG Tab Take 1 Tab by mouth every day. 90 Tab 3   • metoprolol SR (TOPROL XL) 50 MG TABLET SR 24 HR Take 1 Tab by mouth every day. 90 Tab 3   • varenicline (CHANTIX) 1 MG tablet Take 1 Tab by mouth 2 times a day. 30 Tab 1   • Omega-3 Fatty Acids (FISH OIL PO) Take  by mouth.     • Cholecalciferol (VITAMIN D-3 PO) Take  by mouth.     • CALCIUM PO Take  by mouth 2 Times a Day.     • pantoprazole (PROTONIX) 40 MG Tablet Delayed Response Take 40 mg by mouth every day.     • aspirin EC (ECOTRIN) 81 MG Tablet Delayed Response Take 81 mg by mouth every day.     • [DISCONTINUED] rosuvastatin (CRESTOR) 20 MG Tab      • [DISCONTINUED] ezetimibe (ZETIA) 10 MG Tab Take 1 Tab by mouth every day. 100 Tab 3   • Tiotropium Bromide-Olodaterol (STIOLTO RESPIMAT) 2.5-2.5 MCG/ACT Aero Soln Inhale 2 Inhalation by mouth every day. 1 Inhaler 3   • [DISCONTINUED] atorvastatin (LIPITOR) 40 MG Tab TAKE 1 TABLET EVERY EVENING 90 Tab 3   • [DISCONTINUED]  "losartan (COZAAR) 50 MG Tab TAKE 1 TABLET EVERY DAY 90 Tab 3   • [DISCONTINUED] metoprolol (LOPRESSOR) 25 MG Tab Take 25 mg by mouth 2 times a day.     • [DISCONTINUED] alendronate (FOSAMAX) 35 MG tablet Take 35 mg by mouth every 7 days.       No facility-administered encounter medications on file as of 3/13/2018.      Review of Systems   Constitutional: Negative for chills and fever.   HENT: Negative for sore throat.    Eyes: Negative for blurred vision.   Respiratory: Negative for cough and shortness of breath.    Cardiovascular: Negative for chest pain, palpitations, claudication, leg swelling and PND.   Gastrointestinal: Negative for abdominal pain and nausea.   Musculoskeletal: Negative for falls and joint pain.   Skin: Negative for rash.   Neurological: Negative for dizziness, focal weakness and weakness.   Endo/Heme/Allergies: Does not bruise/bleed easily.        Objective:   BP (!) 90/60   Pulse 75   Ht 1.626 m (5' 4\")   Wt 62.6 kg (138 lb)   SpO2 95%   BMI 23.69 kg/m²     Physical Exam   Constitutional: No distress.   HENT:   Mouth/Throat: Oropharynx is clear and moist.   Eyes: No scleral icterus.   Cardiovascular: Normal rate, regular rhythm and intact distal pulses.  Exam reveals no gallop and no friction rub.    No murmur heard.  Pulmonary/Chest: Effort normal and breath sounds normal. She has no rales.   Abdominal: Bowel sounds are normal. There is no tenderness.   Musculoskeletal: She exhibits no edema.   Neurological: She is alert.   Skin: No rash noted. She is not diaphoretic.   Psychiatric: She has a normal mood and affect.     We reviewed her labs    Assessment:     1. Dyslipidemia     2. Essential hypertension  losartan (COZAAR) 50 MG Tab   3. ASCVD (arteriosclerotic cardiovascular disease)     4. Stented coronary artery - RCA and LAD 2012 in setting of [facility]     5. Tobacco use     6. Other hyperlipidemia  ezetimibe (ZETIA) 10 MG Tab   7. Stented coronary artery  losartan (COZAAR) 50 " MG Tab       Medical Decision Making:  Today's Assessment / Status / Plan:     It was my pleasure to meet with Ms. Birmingham.    Renew her medications    I strongly encourage her to stop smoking looks like she will be successful with Chantix she understands the risks    Blood pressure is a little bit I encourage her to reduce her losartan to have to see if this would help    I will see Ms. Birmingham back in 1 year time and encouraged her to follow up with us over the phone or e-mail using my MyChart as issues arise.    It is my pleasure to participate in the care of Ms. Birmingham.  Please do not hesitate to contact me with questions or concerns.    Troy Martinez MD PhD FACC  Cardiologist [facility]

## 2018-03-13 NOTE — LETTER
[facility] [place] [address],   [address]nd [address]  [place] [address]  Phone: [phone]  Fax: [phone]              Carmen Birmingham  1945    Encounter Date: 3/13/2018    Troy Martinez M.D.          PROGRESS NOTE:  Subjective:   Carmen Birmingham is a 73 y.o. female who presents today for follow-up of her history of coronary disease status post stenting in 2012 in the setting of [place]    Been doing okay she recently stopped smoking with the aid of Chantix    She's doing well on anti-lipid control    Past Medical History:   Diagnosis Date   • Hyperlipidemia    • Hypertension    • Osteoporosis    • Stented coronary artery - RCA and LAD 2012 in setting of MI [place]    • Tobacco use      Past Surgical History:   Procedure Laterality Date   • OTHER      Heart Attack 2013     Family History   Problem Relation Age of Onset   • Heart Disease Mother      History   Smoking Status   • Former Smoker   Smokeless Tobacco   • Never Used     No Known Allergies  Outpatient Encounter Prescriptions as of 3/13/2018   Medication Sig Dispense Refill   • rosuvastatin (CRESTOR) 40 MG tablet Take 1 Tab by mouth every day. 90 Tab 3   • ezetimibe (ZETIA) 10 MG Tab Take 1 Tab by mouth every day. 100 Tab 3   • losartan (COZAAR) 50 MG Tab Take 1 Tab by mouth every day. 90 Tab 3   • metoprolol SR (TOPROL XL) 50 MG TABLET SR 24 HR Take 1 Tab by mouth every day. 90 Tab 3   • varenicline (CHANTIX) 1 MG tablet Take 1 Tab by mouth 2 times a day. 30 Tab 1   • Omega-3 Fatty Acids (FISH OIL PO) Take  by mouth.     • Cholecalciferol (VITAMIN D-3 PO) Take  by mouth.     • CALCIUM PO Take  by mouth 2 Times a Day.     • pantoprazole (PROTONIX) 40 MG Tablet Delayed Response Take 40 mg by mouth every day.     • aspirin EC (ECOTRIN) 81 MG Tablet Delayed Response Take 81 mg by mouth every day.     • [DISCONTINUED] rosuvastatin (CRESTOR) 20 MG Tab      • [DISCONTINUED] ezetimibe (ZETIA) 10  "MG Tab Take 1 Tab by mouth every day. 100 Tab 3   • Tiotropium Bromide-Olodaterol (STIOLTO RESPIMAT) 2.5-2.5 MCG/ACT Aero Soln Inhale 2 Inhalation by mouth every day. 1 Inhaler 3   • [DISCONTINUED] atorvastatin (LIPITOR) 40 MG Tab TAKE 1 TABLET EVERY EVENING 90 Tab 3   • [DISCONTINUED] losartan (COZAAR) 50 MG Tab TAKE 1 TABLET EVERY DAY 90 Tab 3   • [DISCONTINUED] metoprolol (LOPRESSOR) 25 MG Tab Take 25 mg by mouth 2 times a day.     • [DISCONTINUED] alendronate (FOSAMAX) 35 MG tablet Take 35 mg by mouth every 7 days.       No facility-administered encounter medications on file as of 3/13/2018.      Review of Systems   Constitutional: Negative for chills and fever.   HENT: Negative for sore throat.    Eyes: Negative for blurred vision.   Respiratory: Negative for cough and shortness of breath.    Cardiovascular: Negative for chest pain, palpitations, claudication, leg swelling and PND.   Gastrointestinal: Negative for abdominal pain and nausea.   Musculoskeletal: Negative for falls and joint pain.   Skin: Negative for rash.   Neurological: Negative for dizziness, focal weakness and weakness.   Endo/Heme/Allergies: Does not bruise/bleed easily.        Objective:   BP (!) 90/60   Pulse 75   Ht 1.626 m (5' 4\")   Wt 62.6 kg (138 lb)   SpO2 95%   BMI 23.69 kg/m²      Physical Exam   Constitutional: No distress.   HENT:   Mouth/Throat: Oropharynx is clear and moist.   Eyes: No scleral icterus.   Cardiovascular: Normal rate, regular rhythm and intact distal pulses.  Exam reveals no gallop and no friction rub.    No murmur heard.  Pulmonary/Chest: Effort normal and breath sounds normal. She has no rales.   Abdominal: Bowel sounds are normal. There is no tenderness.   Musculoskeletal: She exhibits no edema.   Neurological: She is alert.   Skin: No rash noted. She is not diaphoretic.   Psychiatric: She has a normal mood and affect.     We reviewed her labs    Assessment:     1. Dyslipidemia     2. Essential hypertension "  losartan (COZAAR) 50 MG Tab   3. ASCVD (arteriosclerotic cardiovascular disease)     4. Stented coronary artery - RCA and LAD 2012 in setting of MI [place]     5. Tobacco use     6. Other hyperlipidemia  ezetimibe (ZETIA) 10 MG Tab   7. Stented coronary artery  losartan (COZAAR) 50 MG Tab       Medical Decision Making:  Today's Assessment / Status / Plan:     It was my pleasure to meet with Ms. Birmingham.    Renew her medications    I strongly encourage her to stop smoking looks like she will be successful with Chantix she understands the risks    Blood pressure is a little bit I encourage her to reduce her losartan to have to see if this would help    I will see Ms. Birmingham back in 1 year time and encouraged her to follow up with us over the phone or e-mail using my MyChart as issues arise.    It is my pleasure to participate in the care of Ms. Birmingham.  Please do not hesitate to contact me with questions or concerns.    Troy Martinez MD PhD [facility]  Cardiologist [facility]            Marshal Saba M.D.  [address] [address]  VIA Facsimile: [phone]

## 2018-06-17 ENCOUNTER — APPOINTMENT (OUTPATIENT)
Dept: RADIOLOGY | Facility: MEDICAL CENTER | Age: 73
End: 2018-06-17
Attending: EMERGENCY MEDICINE
Payer: MEDICARE

## 2018-06-17 ENCOUNTER — HOSPITAL ENCOUNTER (EMERGENCY)
Facility: MEDICAL CENTER | Age: 73
End: 2018-06-17
Attending: EMERGENCY MEDICINE
Payer: MEDICARE

## 2018-06-17 VITALS
HEIGHT: 68 IN | BODY MASS INDEX: 20.38 KG/M2 | RESPIRATION RATE: 16 BRPM | OXYGEN SATURATION: 98 % | WEIGHT: 134.48 LBS | SYSTOLIC BLOOD PRESSURE: 70 MMHG | HEART RATE: 61 BPM | DIASTOLIC BLOOD PRESSURE: 50 MMHG

## 2018-06-17 LAB
COMPONENT FT 8504FT: NORMAL
COMPONENT P 8504P: NORMAL
COMPONENT P 8504P: NORMAL
COMPONENT R 8504R: NORMAL
GLUCOSE BLD-MCNC: 143 MG/DL (ref 65–99)

## 2018-06-17 PROCEDURE — 700111 HCHG RX REV CODE 636 W/ 250 OVERRIDE (IP): Performed by: EMERGENCY MEDICINE

## 2018-06-17 PROCEDURE — 82962 GLUCOSE BLOOD TEST: CPT

## 2018-06-17 PROCEDURE — 96375 TX/PRO/DX INJ NEW DRUG ADDON: CPT | Mod: XU

## 2018-06-17 PROCEDURE — 700111 HCHG RX REV CODE 636 W/ 250 OVERRIDE (IP)

## 2018-06-17 PROCEDURE — 700101 HCHG RX REV CODE 250: Performed by: EMERGENCY MEDICINE

## 2018-06-17 PROCEDURE — 700117 HCHG RX CONTRAST REV CODE 255: Performed by: EMERGENCY MEDICINE

## 2018-06-17 PROCEDURE — 74175 CTA ABDOMEN W/CONTRAST: CPT

## 2018-06-17 PROCEDURE — 92950 HEART/LUNG RESUSCITATION CPR: CPT

## 2018-06-17 PROCEDURE — 304561 HCHG STAT O2

## 2018-06-17 PROCEDURE — 700105 HCHG RX REV CODE 258: Performed by: EMERGENCY MEDICINE

## 2018-06-17 PROCEDURE — 99291 CRITICAL CARE FIRST HOUR: CPT

## 2018-06-17 PROCEDURE — 96374 THER/PROPH/DIAG INJ IV PUSH: CPT | Mod: XU

## 2018-06-17 RX ORDER — CEFTRIAXONE 2 G/1
2 INJECTION, POWDER, FOR SOLUTION INTRAMUSCULAR; INTRAVENOUS ONCE
Status: DISCONTINUED | OUTPATIENT
Start: 2018-06-17 | End: 2018-06-18 | Stop reason: HOSPADM

## 2018-06-17 RX ORDER — SODIUM CHLORIDE 9 MG/ML
300 INJECTION, SOLUTION INTRAVENOUS ONCE
Status: DISCONTINUED | OUTPATIENT
Start: 2018-06-17 | End: 2018-06-18 | Stop reason: HOSPADM

## 2018-06-17 RX ORDER — CALCIUM CHLORIDE 100 MG/ML
INJECTION INTRAVENOUS; INTRAVENTRICULAR
Status: COMPLETED | OUTPATIENT
Start: 2018-06-17 | End: 2018-06-17

## 2018-06-17 RX ORDER — NOREPINEPHRINE BITARTRATE 1 MG/ML
INJECTION, SOLUTION INTRAVENOUS
Status: COMPLETED
Start: 2018-06-17 | End: 2018-06-17

## 2018-06-17 RX ADMIN — IOHEXOL 100 ML: 350 INJECTION, SOLUTION INTRAVENOUS at 23:00

## 2018-06-17 RX ADMIN — CALCIUM CHLORIDE 1 G: 100 INJECTION, SOLUTION INTRAVENOUS at 22:45

## 2018-06-17 RX ADMIN — NOREPINEPHRINE BITARTRATE 30 MCG/MIN: 1 INJECTION INTRAVENOUS at 22:20

## 2018-06-17 RX ADMIN — SODIUM BICARBONATE 100 MEQ: 84 INJECTION, SOLUTION INTRAVENOUS at 22:46

## 2018-06-17 RX ADMIN — EPINEPHRINE 10 MCG/MIN: 1 INJECTION, SOLUTION INTRAMUSCULAR; SUBCUTANEOUS at 22:20

## 2018-06-17 RX ADMIN — EPINEPHRINE 1 MG: 0.1 INJECTION, SOLUTION ENDOTRACHEAL; INTRACARDIAC; INTRAVENOUS at 22:46

## 2018-06-17 RX ADMIN — DOPAMINE HYDROCHLORIDE 10 MCG/KG/MIN: 160 INJECTION, SOLUTION INTRAVENOUS at 22:30

## 2018-06-17 RX ADMIN — EPINEPHRINE 1 MG: 0.1 INJECTION, SOLUTION ENDOTRACHEAL; INTRACARDIAC; INTRAVENOUS at 22:51

## 2018-06-17 RX ADMIN — EPINEPHRINE 1 MG: 0.1 INJECTION, SOLUTION ENDOTRACHEAL; INTRACARDIAC; INTRAVENOUS at 22:48

## 2018-06-18 RX ORDER — DOPAMINE HYDROCHLORIDE 160 MG/100ML
INJECTION, SOLUTION INTRAVENOUS
Status: DISCONTINUED | OUTPATIENT
Start: 2018-06-17 | End: 2018-06-18 | Stop reason: HOSPADM

## 2018-06-18 NOTE — ED TRIAGE NOTES
Chief Complaint   Patient presents with   • Chest Pain     initial complaint of chest pain at [facility]   • ALOC     intubated, non-responsive on arrival     Pt BIB Med flight as a transfer from [facility] following a chief complaint of chest pain w/ subsequent sudden onset of back pain and non-responsiveness. Pt was intubated at outside medical facility, art line inserted, OG inserted, femoral triple lumen central line inserted, baugh inserted, arrives w/ two vasopressors and signs of cardiogenic shock. Pt also had new onset RBBB and A-fib, w/ a hx of stented LAD and RCA in 2012. Pt straight to CT w/ flight crew, this RN, ICU RN's and ERP following arrival, radiologist in CT during scan. Pt's BP decreased to SBP of 30 in CT, Pt started on dopamine infusion, all pressors maxed out prior to arrival to [facility]. Pt became asystolic following transfer to bed in [facility] 04 at 2245, CPR began immediately. Upon starting CPR, Pt had profuse amount of yariel bloody output from ET tube into Ambu-bag. Pt given Ca++ at 2245, Epi at 2246, HCO3 x2 at 2246. Pulse check at 2248, no pulse, CPR restarted. Epi given again at 2248, pulse check at 2250, no pulse. Epi given again at 2251, pulse check at 2252, time of death announced by ERP at 2253.

## 2018-06-18 NOTE — ED NOTES
Traction contacted for transport to [facility].

## 2018-06-18 NOTE — ED NOTES
See code narrator for all medications and interventions. Red box returned, unused, to blood bank.

## 2018-06-18 NOTE — ED PROVIDER NOTES
ED Provider Note    Scribed for Marcin Hutchinson M.D. by Marcin Hutchinson. 6/17/2018,  10:19 PM.    CHIEF COMPLAINT  Chief Complaint   Patient presents with   • Chest Pain     initial complaint of chest pain at [place]   • ALOC     intubated, non-responsive on arrival       DHARA Perez is a 89 y.o. unknown who presents to the Emergency Department as a critical medical patients, who I advised to make a codeine ordered prior to arrival. At 6:45 PM, I received a call from the transferring physician at [facility] who described the patient is presenting for intermittent chest pain, starting at 10am, becoming more severe throughout the day. He considered her to be in cardiogenic shock, with hypotension, and very poor LV squeeze on her bedside ultrasound. Her EKG at the transferring hospital showed a right bundle branch block and atrial fibrillation. Her previous EKG was reportedly normal. She recently been admitted for cardiac evaluation and had a negative stress test in May. She does have a remote history of a stent. Her outside hospital chest x-ray showed a heavily calcified aorta. Her initial labs show negative troponin, a lactic acid of 7.5, consistent with fairly prolonged hypotension or poor perfusion. Due to persistent hypotension, although the patient was mentating normally, she was started on norepinephrine. At about 7:30 PM, prior to transfer, the patient began deteriorating. The flight medics who accompanied her here reported that she was screaming about low back pain, and then became progressively altered before being intubated. She was intubated, central and arterial lines were placed, and pressors were started. When the patient arrived here, she was on 30 of norepinephrine, 2 of epinephrine, and dobutamine just been started. She went immediately to the CT scanner as a code aorta. I asked the radiologist to come to CT to interpret her CT scan real time. She was also met in CT by the code  "aorta team. Contrast was very slow to opacify the aorta because of poor cardiac output. There is backflow that her pulmonary vasculature was visualized, and there was no evidence of a large central PE. After delay, her aorta was more visible, there is no evidence of a large dissection. The patient had an extremely diseased looking gallbladder with portal venous gas. This raises the possibility of occult sepsis. The patient was transported back to the emergency department where her 3 pressors were maximized. She was observed to be centrally mottled with very poor palpable pulse. She was met at the bedside by cardiology, and we discussed her presentation. Shortly after arrival at the bedside, she lost pulses and high quality ACLS was initiated. She received 3 rounds of epinephrine, 2 A of bicarb, 1 amp of calcium, and there was no return of spontaneous circulation. The code was called at 10:53 PM.    REVIEW OF SYSTEMS  Review of systems unobtainable since the patient is intubated and sedated.    PAST MEDICAL HISTORY   has a past medical history of CAD (coronary artery disease).coronary artery disease, previous stent.    SOCIAL HISTORY  Social History     Social History Main Topics   • Smoking status: Never Smoker   • Smokeless tobacco: Not on file   • Alcohol use No   • Drug use: No   • Sexual activity: Not on file     History   Drug Use No       SURGICAL HISTORY  patient denies any surgical history    CURRENT MEDICATIONS  Home Medications     Reviewed by Monico Hager R.N. (Registered Nurse) on 06/17/18 at 2351  Med List Status: Unable to Obtain   Medication Last Dose Status        Patient Tay Taking any Medications                       ALLERGIES  Not on File    PHYSICAL EXAM  VITAL SIGNS: BP (!) 70/50   Pulse 61   Resp 16   Ht 1.727 m (5' 8\")   Wt 61 kg (134 lb 7.7 oz)   SpO2 98%   BMI 20.45 kg/m²   Pulse ox interpretation: This patient had normal oxygen saturations via her ET tube and " ventilator  Constitutional: Unresponsive, mottled skin with central cyanosis.  HENT: No signs of trauma, Bilateral external ears normal, Nose normal.   Eyes: Conjunctiva normal, Non-icteric.   Neck: Trachea is midline.  Lymphatic: No lymphadenopathy noted.   Cardiovascular: Weakly palpable pulse on arrival. Poor central and peripheral perfusion  Thorax & Lungs: Normal breath sounds within the limits of intubation and sedation.  Abdomen: Soft,  No masses, No pulsatile masses. No peritoneal signs.  Skin: Cool, mottled  Extremities: Peripheral pulses were not palpable. Clear cyanosis.  Musculoskeletal: No or major deformities noted.   Neurologic: GCS 3T  Psychiatric: Unable to assess    DIAGNOSTIC STUDIES / PROCEDURES      LABS  Labs Reviewed   ACCU-CHEK GLUCOSE - Abnormal; Notable for the following:        Result Value    Glucose - Accu-Ck 143 (*)     All other components within normal limits   MASSIVE TRANSFUSION   TROPONIN   BTYPE NATRIURETIC PEPTIDE   CBC WITH DIFFERENTIAL   COMP METABOLIC PANEL   APTT   PROTHROMBIN TIME   HCG QUANTITATIVE     All labs reviewed by me.    RADIOLOGY  CT-CTA COMPLETE THORACOABDOMINAL AORTA   Final Result      1.  Small distal abdominal aortic saccular aneurysm.   2.  No gross evidence for aortic dissection or rupture although evaluation is suboptimal.   3.  No evidence for pulmonary embolus.   4.  Findings suggest elevated RIGHT-sided pressures.   5.  Bilateral dependent atelectasis with small pleural effusions.   6.  Portal venous gas in the liver of uncertain etiology.  Concerning for mucosal breakdown in the bowel.   7.  Small caliber mesenteric vessels, possibly due to hypotension.   8.  Marked wall thickening gallbladder concerning for cholecystitis.   9.  Edema in the az hepatis about the pancreatic head and mesenteric root of uncertain etiology.  Pancreatitis is not excluded.   10.  Small hyperdense cyst or mass in the lower pole RIGHT kidney.   11.  Small amount of ascites  present.   12.  No pneumoperitoneum.      These findings were discussed with BRENDA REED on 2018 10:40 PM.        The radiologist's interpretation of all radiological studies have been reviewed by me.    COURSE & MEDICAL DECISION MAKING  Nursing notes, VS, PMSFHx reviewed in chart.     11:00 PM She went immediately to the CT scanner as a code aorta upon arrival to our emergency department. I asked the radiologist to come to CT to interpret her CT scan real time. She was also met in CT by the code aorta team. Contrast was very slow to opacify the aorta because of poor cardiac output. There is backflow that her pulmonary vasculature was visualized, and there was no evidence of a large central PE. After delay, her aorta was more visible, there is no evidence of a large dissection. The patient had an extremely diseased looking gallbladder with portal venous gas. This raises the possibility of occult sepsis. The patient was transported back to the emergency department where her 3 pressors were maximized. She was observed to be centrally mottled with very poor palpable pulse. She was met at the bedside by cardiology, and we discussed her presentation. Shortly after arrival at the bedside, she lost pulses and high quality ACLS was initiated. She received 3 rounds of epinephrine, 2 A of bicarb, 1 amp of calcium, and there was no return of spontaneous circulation. The code was called at 10:53 PM.      DISPOSITION:      Critical Care: The total critical care time on this patient is 30 minutes, resuscitating patient, speaking with [facility] flight crew, pharmacist, radiologist, cardiologist, and deciphering test results. This 30 minutes is exclusive of separately billable procedures.    FINAL IMPRESSION  1. Cardiovascular collapse  2. Cardiomyopathy

## 2018-06-18 NOTE — DISCHARGE PLANNING
Medical Social Work     DOT received a call from the ER waiting area advising that the pt family is here and was put in the family consult room. SW went and advised the ERP that the family is here in the family room. The ERP and SW met with the family and advised them that the pt . SW and the ERP provided emotional support and advised the family that they would be let back shortly to see the pt. Pt family was being difficult with staff at times.     SW escorted the family to the pt room family stayed a short amount of time then walked back to the family room. The pt  arrived and requested to see the pt. SW escorted Roberth the pt  to the pt room.Family seemed to be less difficult once the pt  Roberth arrived.      SW provided the pt family with the difficult times packet and provided emotional support. DOT also provided the family with the ER SW contact info in case they had questions once they left [facility].     Plan: DOT will remain available for family support.

## 2018-06-18 NOTE — DISCHARGE PLANNING
Medical Social Work     Referral: Critical Patient    Sw responded to critical pateint.  Pt was BIB [facility] as a transfer after having chest pain. CPR was in progress when the SW responded the the pt room. DOT was able to obtain phone numbers for the pt family Sherry (grandaughter) [phone] and Familia () [phone]. DOT was able to contact the pt family. DOT advised family that the pt is still critical and they are doing CPR on the pt. Sherry and Familia stated they are on there way to [facility].     TOD is at 1288.    Plan: SW and ERP are waiting for family to arrive to notify them of the pt death.